# Patient Record
Sex: MALE | Race: WHITE | NOT HISPANIC OR LATINO
[De-identification: names, ages, dates, MRNs, and addresses within clinical notes are randomized per-mention and may not be internally consistent; named-entity substitution may affect disease eponyms.]

---

## 2017-01-06 ENCOUNTER — APPOINTMENT (OUTPATIENT)
Dept: OPHTHALMOLOGY | Facility: CLINIC | Age: 52
End: 2017-01-06

## 2017-02-23 ENCOUNTER — APPOINTMENT (OUTPATIENT)
Dept: OPHTHALMOLOGY | Facility: CLINIC | Age: 52
End: 2017-02-23

## 2017-02-23 ENCOUNTER — APPOINTMENT (OUTPATIENT)
Dept: INTERNAL MEDICINE | Facility: CLINIC | Age: 52
End: 2017-02-23

## 2017-02-23 VITALS — RESPIRATION RATE: 12 BRPM | SYSTOLIC BLOOD PRESSURE: 122 MMHG | HEART RATE: 75 BPM | DIASTOLIC BLOOD PRESSURE: 74 MMHG

## 2017-02-23 VITALS — WEIGHT: 202 LBS | HEIGHT: 68 IN | BODY MASS INDEX: 30.62 KG/M2

## 2017-02-23 VITALS — DIASTOLIC BLOOD PRESSURE: 80 MMHG | SYSTOLIC BLOOD PRESSURE: 116 MMHG

## 2017-02-23 DIAGNOSIS — I25.10 ATHEROSCLEROTIC HEART DISEASE OF NATIVE CORONARY ARTERY W/OUT ANGINA PECTORIS: ICD-10-CM

## 2017-02-23 DIAGNOSIS — G47.33 OBSTRUCTIVE SLEEP APNEA (ADULT) (PEDIATRIC): ICD-10-CM

## 2017-02-23 DIAGNOSIS — E11.9 TYPE 2 DIABETES MELLITUS W/OUT COMPLICATIONS: ICD-10-CM

## 2017-02-27 ENCOUNTER — APPOINTMENT (OUTPATIENT)
Dept: OPHTHALMOLOGY | Facility: AMBULATORY MEDICAL SERVICES | Age: 52
End: 2017-02-27

## 2017-02-28 ENCOUNTER — APPOINTMENT (OUTPATIENT)
Dept: OPHTHALMOLOGY | Facility: CLINIC | Age: 52
End: 2017-02-28

## 2017-03-07 ENCOUNTER — APPOINTMENT (OUTPATIENT)
Dept: OPHTHALMOLOGY | Facility: CLINIC | Age: 52
End: 2017-03-07

## 2017-03-28 ENCOUNTER — APPOINTMENT (OUTPATIENT)
Dept: OPHTHALMOLOGY | Facility: CLINIC | Age: 52
End: 2017-03-28

## 2017-04-04 ENCOUNTER — APPOINTMENT (OUTPATIENT)
Dept: OPHTHALMOLOGY | Facility: CLINIC | Age: 52
End: 2017-04-04

## 2017-04-24 ENCOUNTER — APPOINTMENT (OUTPATIENT)
Dept: OPHTHALMOLOGY | Facility: AMBULATORY MEDICAL SERVICES | Age: 52
End: 2017-04-24

## 2017-04-25 ENCOUNTER — APPOINTMENT (OUTPATIENT)
Dept: OPHTHALMOLOGY | Facility: CLINIC | Age: 52
End: 2017-04-25

## 2017-05-02 ENCOUNTER — APPOINTMENT (OUTPATIENT)
Dept: OPHTHALMOLOGY | Facility: CLINIC | Age: 52
End: 2017-05-02

## 2017-05-22 ENCOUNTER — APPOINTMENT (OUTPATIENT)
Dept: ORTHOPEDIC SURGERY | Facility: CLINIC | Age: 52
End: 2017-05-22

## 2017-05-22 VITALS
HEART RATE: 76 BPM | HEIGHT: 68 IN | SYSTOLIC BLOOD PRESSURE: 153 MMHG | BODY MASS INDEX: 30.62 KG/M2 | WEIGHT: 202 LBS | DIASTOLIC BLOOD PRESSURE: 83 MMHG

## 2017-06-08 ENCOUNTER — APPOINTMENT (OUTPATIENT)
Dept: OPHTHALMOLOGY | Facility: CLINIC | Age: 52
End: 2017-06-08

## 2017-09-05 ENCOUNTER — APPOINTMENT (OUTPATIENT)
Dept: OPHTHALMOLOGY | Facility: CLINIC | Age: 52
End: 2017-09-05
Payer: COMMERCIAL

## 2017-09-05 DIAGNOSIS — H25.13 AGE-RELATED NUCLEAR CATARACT, BILATERAL: ICD-10-CM

## 2017-09-05 PROCEDURE — 92012 INTRM OPH EXAM EST PATIENT: CPT

## 2017-10-09 ENCOUNTER — RX RENEWAL (OUTPATIENT)
Age: 52
End: 2017-10-09

## 2017-10-23 ENCOUNTER — MED ADMIN CHARGE (OUTPATIENT)
Age: 52
End: 2017-10-23

## 2017-10-23 ENCOUNTER — OTHER (OUTPATIENT)
Age: 52
End: 2017-10-23

## 2018-01-12 ENCOUNTER — APPOINTMENT (OUTPATIENT)
Dept: OPHTHALMOLOGY | Facility: CLINIC | Age: 53
End: 2018-01-12
Payer: COMMERCIAL

## 2018-01-12 DIAGNOSIS — H26.493 OTHER SECONDARY CATARACT, BILATERAL: ICD-10-CM

## 2018-01-12 DIAGNOSIS — H40.003 PREGLAUCOMA, UNSPECIFIED, BILATERAL: ICD-10-CM

## 2018-01-12 PROCEDURE — 92250 FUNDUS PHOTOGRAPHY W/I&R: CPT

## 2018-01-12 PROCEDURE — 92014 COMPRE OPH EXAM EST PT 1/>: CPT

## 2018-08-29 ENCOUNTER — APPOINTMENT (OUTPATIENT)
Dept: RHEUMATOLOGY | Facility: CLINIC | Age: 53
End: 2018-08-29
Payer: COMMERCIAL

## 2018-08-29 VITALS — HEIGHT: 68 IN | WEIGHT: 222 LBS | BODY MASS INDEX: 33.65 KG/M2

## 2018-08-29 DIAGNOSIS — M25.521 PAIN IN RIGHT ELBOW: ICD-10-CM

## 2018-08-29 DIAGNOSIS — M25.562 PAIN IN LEFT KNEE: ICD-10-CM

## 2018-08-29 DIAGNOSIS — G89.29 PAIN IN LEFT KNEE: ICD-10-CM

## 2018-08-29 PROCEDURE — 99244 OFF/OP CNSLTJ NEW/EST MOD 40: CPT | Mod: 25

## 2018-08-29 PROCEDURE — 20605 DRAIN/INJ JOINT/BURSA W/O US: CPT | Mod: RT

## 2018-08-29 RX ORDER — METHYLPREDNISOLONE 4 MG/1
4 TABLET ORAL
Qty: 1 | Refills: 1 | Status: COMPLETED | COMMUNITY
Start: 2017-05-22 | End: 2018-08-29

## 2018-08-29 RX ORDER — PREDNISOLONE ACETATE 10 MG/ML
1 SUSPENSION/ DROPS OPHTHALMIC
Qty: 1 | Refills: 5 | Status: COMPLETED | COMMUNITY
Start: 2017-02-14 | End: 2018-08-29

## 2018-08-29 RX ORDER — OFLOXACIN 3 MG/ML
0.3 SOLUTION/ DROPS OPHTHALMIC 4 TIMES DAILY
Qty: 1 | Refills: 5 | Status: COMPLETED | COMMUNITY
Start: 2017-02-14 | End: 2018-08-29

## 2018-08-29 RX ORDER — KETOROLAC TROMETHAMINE 4 MG/ML
0.4 SOLUTION/ DROPS OPHTHALMIC
Qty: 1 | Refills: 5 | Status: COMPLETED | COMMUNITY
Start: 2017-02-14 | End: 2018-08-29

## 2018-09-24 ENCOUNTER — APPOINTMENT (OUTPATIENT)
Dept: ORTHOPEDIC SURGERY | Facility: CLINIC | Age: 53
End: 2018-09-24
Payer: COMMERCIAL

## 2018-09-24 ENCOUNTER — OTHER (OUTPATIENT)
Age: 53
End: 2018-09-24

## 2018-09-24 VITALS
WEIGHT: 222 LBS | BODY MASS INDEX: 33.65 KG/M2 | SYSTOLIC BLOOD PRESSURE: 135 MMHG | DIASTOLIC BLOOD PRESSURE: 79 MMHG | HEART RATE: 63 BPM | HEIGHT: 68 IN

## 2018-09-24 DIAGNOSIS — M70.21 OLECRANON BURSITIS, RIGHT ELBOW: ICD-10-CM

## 2018-09-24 PROCEDURE — 73080 X-RAY EXAM OF ELBOW: CPT | Mod: RT

## 2018-09-24 PROCEDURE — 99214 OFFICE O/P EST MOD 30 MIN: CPT

## 2018-09-26 PROBLEM — M70.21 OLECRANON BURSITIS OF RIGHT ELBOW: Status: ACTIVE | Noted: 2018-08-29

## 2018-10-23 ENCOUNTER — RX RENEWAL (OUTPATIENT)
Age: 53
End: 2018-10-23

## 2019-04-30 ENCOUNTER — TRANSCRIPTION ENCOUNTER (OUTPATIENT)
Age: 54
End: 2019-04-30

## 2019-09-03 ENCOUNTER — RX RENEWAL (OUTPATIENT)
Age: 54
End: 2019-09-03

## 2020-01-17 ENCOUNTER — APPOINTMENT (OUTPATIENT)
Dept: OPHTHALMOLOGY | Facility: CLINIC | Age: 55
End: 2020-01-17
Payer: COMMERCIAL

## 2020-01-17 ENCOUNTER — NON-APPOINTMENT (OUTPATIENT)
Age: 55
End: 2020-01-17

## 2020-01-17 PROCEDURE — 92133 CPTRZD OPH DX IMG PST SGM ON: CPT

## 2020-01-17 PROCEDURE — 92083 EXTENDED VISUAL FIELD XM: CPT

## 2020-01-17 PROCEDURE — 92012 INTRM OPH EXAM EST PATIENT: CPT

## 2020-04-07 ENCOUNTER — APPOINTMENT (OUTPATIENT)
Dept: DISASTER EMERGENCY | Facility: CLINIC | Age: 55
End: 2020-04-07

## 2020-04-26 ENCOUNTER — MESSAGE (OUTPATIENT)
Age: 55
End: 2020-04-26

## 2020-05-04 ENCOUNTER — APPOINTMENT (OUTPATIENT)
Dept: DISASTER EMERGENCY | Facility: HOSPITAL | Age: 55
End: 2020-05-04

## 2020-05-05 LAB
SARS-COV-2 IGG SERPL IA-ACNC: <0.1 INDEX
SARS-COV-2 IGG SERPL QL IA: NEGATIVE

## 2020-06-11 LAB
25(OH)D3 SERPL-MCNC: 33.3 NG/ML
ALBUMIN SERPL ELPH-MCNC: 4.4 G/DL
ALP BLD-CCNC: 66 U/L
ALT SERPL-CCNC: 17 U/L
ANION GAP SERPL CALC-SCNC: 10 MMOL/L
AST SERPL-CCNC: 16 U/L
BASOPHILS # BLD AUTO: 0.03 K/UL
BASOPHILS NFR BLD AUTO: 0.5 %
BILIRUB SERPL-MCNC: 0.6 MG/DL
BUN SERPL-MCNC: 17 MG/DL
CALCIUM SERPL-MCNC: 9.9 MG/DL
CHLORIDE SERPL-SCNC: 102 MMOL/L
CHOLEST SERPL-MCNC: 148 MG/DL
CHOLEST/HDLC SERPL: 3.4 RATIO
CO2 SERPL-SCNC: 26 MMOL/L
CREAT SERPL-MCNC: 0.98 MG/DL
EOSINOPHIL # BLD AUTO: 0.09 K/UL
EOSINOPHIL NFR BLD AUTO: 1.5 %
ESTIMATED AVERAGE GLUCOSE: 157 MG/DL
GLUCOSE SERPL-MCNC: 148 MG/DL
HBA1C MFR BLD HPLC: 7.1 %
HCT VFR BLD CALC: 45.8 %
HDLC SERPL-MCNC: 44 MG/DL
HGB BLD-MCNC: 14.2 G/DL
IMM GRANULOCYTES NFR BLD AUTO: 0 %
LDLC SERPL CALC-MCNC: 73 MG/DL
LYMPHOCYTES # BLD AUTO: 1.59 K/UL
LYMPHOCYTES NFR BLD AUTO: 25.7 %
MAN DIFF?: NORMAL
MCHC RBC-ENTMCNC: 30.9 PG
MCHC RBC-ENTMCNC: 31 GM/DL
MCV RBC AUTO: 99.6 FL
MONOCYTES # BLD AUTO: 0.57 K/UL
MONOCYTES NFR BLD AUTO: 9.2 %
NEUTROPHILS # BLD AUTO: 3.91 K/UL
NEUTROPHILS NFR BLD AUTO: 63.1 %
PLATELET # BLD AUTO: 233 K/UL
POTASSIUM SERPL-SCNC: 5.4 MMOL/L
PROT SERPL-MCNC: 6.7 G/DL
PSA FREE FLD-MCNC: 58 %
PSA FREE SERPL-MCNC: 0.41 NG/ML
PSA SERPL-MCNC: 0.71 NG/ML
RBC # BLD: 4.6 M/UL
RBC # FLD: 13.5 %
SODIUM SERPL-SCNC: 138 MMOL/L
TRIGL SERPL-MCNC: 154 MG/DL
WBC # FLD AUTO: 6.19 K/UL

## 2020-06-15 LAB
HGB A MFR BLD: 97.5 %
HGB A2 MFR BLD: 2.5 %
HGB FRACT BLD-IMP: NORMAL

## 2020-07-28 ENCOUNTER — MED ADMIN CHARGE (OUTPATIENT)
Age: 55
End: 2020-07-28

## 2020-07-28 DIAGNOSIS — Z23 ENCOUNTER FOR IMMUNIZATION: ICD-10-CM

## 2021-04-01 ENCOUNTER — RX RENEWAL (OUTPATIENT)
Age: 56
End: 2021-04-01

## 2021-04-19 ENCOUNTER — APPOINTMENT (OUTPATIENT)
Dept: CARDIOLOGY | Facility: CLINIC | Age: 56
End: 2021-04-19

## 2021-04-21 LAB
ALBUMIN SERPL ELPH-MCNC: 4.6 G/DL
ALP BLD-CCNC: 74 U/L
ALT SERPL-CCNC: 29 U/L
ANION GAP SERPL CALC-SCNC: 10 MMOL/L
AST SERPL-CCNC: 22 U/L
BASOPHILS # BLD AUTO: 0.03 K/UL
BASOPHILS NFR BLD AUTO: 0.4 %
BILIRUB SERPL-MCNC: 0.7 MG/DL
BUN SERPL-MCNC: 13 MG/DL
CALCIUM SERPL-MCNC: 9.9 MG/DL
CHLORIDE SERPL-SCNC: 102 MMOL/L
CHOLEST SERPL-MCNC: 185 MG/DL
CO2 SERPL-SCNC: 27 MMOL/L
CREAT SERPL-MCNC: 0.97 MG/DL
EOSINOPHIL # BLD AUTO: 0.16 K/UL
EOSINOPHIL NFR BLD AUTO: 2 %
ESTIMATED AVERAGE GLUCOSE: 192 MG/DL
GLUCOSE SERPL-MCNC: 176 MG/DL
HBA1C MFR BLD HPLC: 8.3 %
HCT VFR BLD CALC: 45.6 %
HDLC SERPL-MCNC: 40 MG/DL
HGB BLD-MCNC: 14.9 G/DL
IMM GRANULOCYTES NFR BLD AUTO: 0.2 %
LDLC SERPL CALC-MCNC: 92 MG/DL
LYMPHOCYTES # BLD AUTO: 1.89 K/UL
LYMPHOCYTES NFR BLD AUTO: 23.1 %
MAN DIFF?: NORMAL
MCHC RBC-ENTMCNC: 30.7 PG
MCHC RBC-ENTMCNC: 32.7 GM/DL
MCV RBC AUTO: 93.8 FL
MONOCYTES # BLD AUTO: 0.71 K/UL
MONOCYTES NFR BLD AUTO: 8.7 %
NEUTROPHILS # BLD AUTO: 5.36 K/UL
NEUTROPHILS NFR BLD AUTO: 65.6 %
NONHDLC SERPL-MCNC: 145 MG/DL
PLATELET # BLD AUTO: 245 K/UL
POTASSIUM SERPL-SCNC: 4.7 MMOL/L
PROT SERPL-MCNC: 7 G/DL
RBC # BLD: 4.86 M/UL
RBC # FLD: 13.3 %
SODIUM SERPL-SCNC: 139 MMOL/L
TRIGL SERPL-MCNC: 266 MG/DL
WBC # FLD AUTO: 8.17 K/UL

## 2021-05-11 ENCOUNTER — TRANSCRIPTION ENCOUNTER (OUTPATIENT)
Age: 56
End: 2021-05-11

## 2021-05-13 ENCOUNTER — APPOINTMENT (OUTPATIENT)
Dept: ENDOCRINOLOGY | Facility: CLINIC | Age: 56
End: 2021-05-13
Payer: COMMERCIAL

## 2021-05-13 VITALS
BODY MASS INDEX: 31.93 KG/M2 | OXYGEN SATURATION: 96 % | TEMPERATURE: 97.2 F | DIASTOLIC BLOOD PRESSURE: 74 MMHG | WEIGHT: 210 LBS | HEART RATE: 78 BPM | SYSTOLIC BLOOD PRESSURE: 126 MMHG

## 2021-05-13 DIAGNOSIS — Z83.3 FAMILY HISTORY OF DIABETES MELLITUS: ICD-10-CM

## 2021-05-13 PROCEDURE — 99072 ADDL SUPL MATRL&STAF TM PHE: CPT

## 2021-05-13 PROCEDURE — 99204 OFFICE O/P NEW MOD 45 MIN: CPT

## 2021-05-13 RX ORDER — PEN NEEDLE, DIABETIC 29 G X1/2"
32G X 4 MM NEEDLE, DISPOSABLE MISCELLANEOUS
Qty: 1 | Refills: 1 | Status: ACTIVE | COMMUNITY
Start: 2021-05-13 | End: 1900-01-01

## 2021-05-13 NOTE — HISTORY OF PRESENT ILLNESS
[FreeTextEntry1] : cc: diabetes\par \par 55 year old man with CAD post PTCA in 2004, recently diagnosed with T2DM.  He has had prediabetes for the past 5 years or so, then last hba1c 8.1.   Not currently on medication.  He limts carbs ` 90% of the time, red meat once per week, fish 3-4 times per week, eats a lot of salad/vegetables.  [place] diet.   Walks 30 minutes every other morning (=goal).  Has seen opthalmologist withint past year, no retinopathy.  No nephropathy or neuropathy.  No numbness/tingling in extremities.  No blurry vision (had b/l cataract surgery\par \par Has not had hypertension\par \par Hyperlipidemia on crestor 20 mg

## 2021-05-13 NOTE — ADDENDUM
[FreeTextEntry1] : Patient instructed in correct injection technique for  Ozempic . Educated on  dose, frequency for medication. Educated on storage and sharps disposal. Educated on common side effects. Patient demonstrates understanding by teach back and return demonstration. \otis Garsia RN

## 2021-05-13 NOTE — PHYSICAL EXAM
[Alert] : alert [No Acute Distress] : no acute distress [Normal Sclera/Conjunctiva] : normal sclera/conjunctiva [No Proptosis] : no proptosis [No LAD] : no lymphadenopathy [Thyroid Not Enlarged] : the thyroid was not enlarged [No Respiratory Distress] : no respiratory distress [Clear to Auscultation] : lungs were clear to auscultation bilaterally [Normal S1, S2] : normal S1 and S2 [Regular Rhythm] : with a regular rhythm [No Edema] : no peripheral edema [Pedal Pulses Normal] : the pedal pulses are present [Normal Bowel Sounds] : normal bowel sounds [Not Tender] : non-tender [Soft] : abdomen soft [No Spinal Tenderness] : no spinal tenderness [No Involuntary Movements] : no involuntary movements were seen [Normal Strength/Tone] : muscle strength and tone were normal [Right Foot Was Examined] : right foot ~C was examined [Left Foot Was Examined] : left foot ~C was examined [Normal Reflexes] : deep tendon reflexes were 2+ and symmetric [No Tremors] : no tremors [Normal Affect] : the affect was normal [Normal Mood] : the mood was normal [Foot Ulcers] : no foot ulcers

## 2021-05-13 NOTE — ASSESSMENT
[FreeTextEntry1] : 55 year old man with T2DM, uncontrolled, recently diagnosed and obesity\par  - Discussed with pt carbohydrate consistent diet, he does not feel that he would benefit from meeting with and RD, as already limiting carbohydrate intake.  Counseled on increasing exercise\par  - Given history of atherosclerotic heart disease, Recommend GLP-1.  Will start with ozempic 0.25 mg daily, plan to increase to 0.5 mg after 4 weeks.  Counseled pt on risks/potential side effects\par  - Recommend checking glucose at different times of day and send glucose log in 2 weeks\par  - Retinopathy screening up to date\par  - Blood pressure at goal\par  - Check urine microalbumin\par - Check TSH\par  - Had covid vaccine\par \par Hyperlipidemia - increase rosuvastatin to 40 mg, repeat fasting lipids next visit, consider adding ezetimibe\par \par f/u 3 months, send glucose log in 3 months

## 2021-05-13 NOTE — REVIEW OF SYSTEMS
[As Noted in HPI] : as noted in HPI [Fatigue] : no fatigue [Visual Field Defect] : no visual field defect [Dysphagia] : no dysphagia [Chest Pain] : no chest pain [Shortness Of Breath] : no shortness of breath [Nausea] : no nausea [Dysuria] : no dysuria [Joint Pain] : no joint pain [Acanthosis] : no acanthosis  [Headaches] : no headaches [Depression] : no depression [Easy Bleeding] : no ~M tendency for easy bleeding

## 2021-05-14 ENCOUNTER — TRANSCRIPTION ENCOUNTER (OUTPATIENT)
Age: 56
End: 2021-05-14

## 2021-05-14 LAB
CREAT SPEC-SCNC: 183 MG/DL
MICROALBUMIN 24H UR DL<=1MG/L-MCNC: <1.2 MG/DL
MICROALBUMIN/CREAT 24H UR-RTO: NORMAL MG/G
TSH SERPL-ACNC: 2.04 UIU/ML

## 2021-05-15 ENCOUNTER — TRANSCRIPTION ENCOUNTER (OUTPATIENT)
Age: 56
End: 2021-05-15

## 2021-06-08 ENCOUNTER — TRANSCRIPTION ENCOUNTER (OUTPATIENT)
Age: 56
End: 2021-06-08

## 2021-08-09 ENCOUNTER — APPOINTMENT (OUTPATIENT)
Dept: ENDOCRINOLOGY | Facility: CLINIC | Age: 56
End: 2021-08-09
Payer: COMMERCIAL

## 2021-08-09 VITALS
HEART RATE: 76 BPM | TEMPERATURE: 97.7 F | WEIGHT: 207.67 LBS | OXYGEN SATURATION: 97 % | SYSTOLIC BLOOD PRESSURE: 122 MMHG | BODY MASS INDEX: 31.58 KG/M2 | DIASTOLIC BLOOD PRESSURE: 80 MMHG

## 2021-08-09 LAB — HBA1C MFR BLD HPLC: 7.3

## 2021-08-09 PROCEDURE — 83036 HEMOGLOBIN GLYCOSYLATED A1C: CPT | Mod: QW

## 2021-08-09 PROCEDURE — 99214 OFFICE O/P EST MOD 30 MIN: CPT | Mod: 25

## 2021-08-09 NOTE — ASSESSMENT
[FreeTextEntry1] : 56 year old man with T2DM, Hba1c improved, slightly above goal\par  - Recommend increasing ozempic to 1.0 mg\par  - Check  glucose at different times of day and send glucose log \par  - Retinopathy screening up to date\par  - Blood pressure at goal\par  - urine microalbumin neg\par  - Had covid vaccine\par \par Hyperlipidemia - check fasting lipids on increase dose of crestor\par \par f/u 3 months, send glucose  log ~ 2 weeks after increasing ozempic dose

## 2021-08-09 NOTE — PHYSICAL EXAM
[Alert] : alert [Healthy Appearance] : healthy appearance [No Acute Distress] : no acute distress [Normal Sclera/Conjunctiva] : normal sclera/conjunctiva [No Proptosis] : no proptosis [No Respiratory Distress] : no respiratory distress [Clear to Auscultation] : lungs were clear to auscultation bilaterally [Normal S1, S2] : normal S1 and S2 [Regular Rhythm] : with a regular rhythm [No Edema] : no peripheral edema [Normal Bowel Sounds] : normal bowel sounds [Not Tender] : non-tender [Soft] : abdomen soft [No Spinal Tenderness] : no spinal tenderness [No Involuntary Movements] : no involuntary movements were seen [Normal Reflexes] : deep tendon reflexes were 2+ and symmetric [No Tremors] : no tremors [Normal Affect] : the affect was normal [Normal Mood] : the mood was normal

## 2021-08-09 NOTE — HISTORY OF PRESENT ILLNESS
[FreeTextEntry1] : cc: diabetes\par \par 56 year old man with CAD post PTCA in 2004, recently diagnosed with T2DM.  Currently taking ozempic 0.5 mg weekly.  Feeling ok.  Has had decreased appetite.  Some aches and pains.  Fasting glucose 130s mg/dl.  Sometimes checks later in the day, values up to 167 mg/dl.\par He has been limiting carbohydrate intake to some extent.   Walks 30 minutes twice weekly  Last opthalmologist  visit was about 6 months ago  , no retinopathy.  No nephropathy or neuropathy.  \par \par Blood pressure has been controlled\par \par Hyperlipidemia: taking  crestor 40 mg\par \par Had covid vaccine

## 2021-12-13 ENCOUNTER — APPOINTMENT (OUTPATIENT)
Dept: ENDOCRINOLOGY | Facility: CLINIC | Age: 56
End: 2021-12-13
Payer: COMMERCIAL

## 2021-12-13 VITALS
SYSTOLIC BLOOD PRESSURE: 118 MMHG | TEMPERATURE: 97.4 F | DIASTOLIC BLOOD PRESSURE: 62 MMHG | BODY MASS INDEX: 30.17 KG/M2 | WEIGHT: 198.42 LBS | HEART RATE: 79 BPM | OXYGEN SATURATION: 98 %

## 2021-12-13 DIAGNOSIS — E11.65 TYPE 2 DIABETES MELLITUS WITH HYPERGLYCEMIA: ICD-10-CM

## 2021-12-13 LAB — HBA1C MFR BLD HPLC: 6.2

## 2021-12-13 PROCEDURE — 83036 HEMOGLOBIN GLYCOSYLATED A1C: CPT | Mod: QW

## 2021-12-13 PROCEDURE — 99214 OFFICE O/P EST MOD 30 MIN: CPT | Mod: 25

## 2021-12-13 NOTE — ASSESSMENT
[FreeTextEntry1] : 56 year old man with T2DM, well controlled and obesity\par  - Continue  ozempic  1.0 mg weekly\par  - Recommend checking occasional postprandial  glucose values.\par  - Retinopathy screening up to date\par  - Blood pressure at goal\par  - urine microalbumin neg\par  - Had covid vaccine\par  - had flu shot\par  - check cmp\par \par Hyperlipidemia - check fasting lipids on increased dose of crestor\par \par Will be having repeat blood tests with pmd soon and will send results\par \par f/u 6 months, send lipid, bmp results

## 2021-12-13 NOTE — HISTORY OF PRESENT ILLNESS
[FreeTextEntry1] : cc: diabetes\par \par 56 year old man with CAD post PTCA in 2004,  diagnosed with T2DM <1 year ago.  Currently taking ozempic 1.0 mg weekly.  Checking glucose fasting, values have been 120 - 130 mg/dl\par No hypoglycemia\par Limiting carbohydrate intake somewhat.  Walks 30 minutes twice weekly  Has been losing weight.  Last opthalmologist  visit was about 9 months ago  , no retinopathy.  No nephropathy or neuropathy.  \par \par Blood pressure has been controlled\par \par Hyperlipidemia: taking  crestor 40 mg\par \par Had covid vaccine\par Had flu shot

## 2022-03-22 ENCOUNTER — TRANSCRIPTION ENCOUNTER (OUTPATIENT)
Age: 57
End: 2022-03-22

## 2022-03-28 ENCOUNTER — RX RENEWAL (OUTPATIENT)
Age: 57
End: 2022-03-28

## 2022-03-31 ENCOUNTER — RX RENEWAL (OUTPATIENT)
Age: 57
End: 2022-03-31

## 2022-06-20 ENCOUNTER — APPOINTMENT (OUTPATIENT)
Dept: ENDOCRINOLOGY | Facility: CLINIC | Age: 57
End: 2022-06-20

## 2022-07-14 ENCOUNTER — APPOINTMENT (OUTPATIENT)
Dept: ENDOCRINOLOGY | Facility: CLINIC | Age: 57
End: 2022-07-14

## 2022-07-14 VITALS
SYSTOLIC BLOOD PRESSURE: 128 MMHG | HEIGHT: 68 IN | BODY MASS INDEX: 30.62 KG/M2 | HEART RATE: 76 BPM | DIASTOLIC BLOOD PRESSURE: 78 MMHG | WEIGHT: 202 LBS | OXYGEN SATURATION: 98 % | TEMPERATURE: 97.3 F

## 2022-07-14 LAB
GLUCOSE BLDC GLUCOMTR-MCNC: 133
HBA1C MFR BLD HPLC: 5.9

## 2022-07-14 PROCEDURE — 83036 HEMOGLOBIN GLYCOSYLATED A1C: CPT | Mod: QW

## 2022-07-14 PROCEDURE — 82962 GLUCOSE BLOOD TEST: CPT

## 2022-07-14 PROCEDURE — 99214 OFFICE O/P EST MOD 30 MIN: CPT | Mod: 25

## 2022-07-14 NOTE — PHYSICAL EXAM
[Alert] : alert [Healthy Appearance] : healthy appearance [No Acute Distress] : no acute distress [Normal Sclera/Conjunctiva] : normal sclera/conjunctiva [No Proptosis] : no proptosis [No LAD] : no lymphadenopathy [Thyroid Not Enlarged] : the thyroid was not enlarged [No Respiratory Distress] : no respiratory distress [Clear to Auscultation] : lungs were clear to auscultation bilaterally [Normal S1, S2] : normal S1 and S2 [Regular Rhythm] : with a regular rhythm [No Edema] : no peripheral edema [Pedal Pulses Normal] : the pedal pulses are present [Normal Bowel Sounds] : normal bowel sounds [Soft] : abdomen soft [No Spinal Tenderness] : no spinal tenderness [No Involuntary Movements] : no involuntary movements were seen [Normal Strength/Tone] : muscle strength and tone were normal [Normal Reflexes] : deep tendon reflexes were 2+ and symmetric [No Tremors] : no tremors [Normal Sensation on Monofilament Testing] : normal sensation on monofilament testing of lower extremities [Normal Affect] : the affect was normal [Normal Mood] : the mood was normal [Foot Ulcers] : no foot ulcers

## 2022-07-14 NOTE — HISTORY OF PRESENT ILLNESS
[FreeTextEntry1] : cc: diabetes\par \par 57 year old man with CAD post PTCA in 2004,  diagnosed with T2DM ~1 year ago.  Currently taking ozempic 1.0 mg weekly.  He checks glucose fasting, values have been 120 - 130 mg/dl, reports no hypoglycemia\par DIet-Limiting carbohydrate intake somewhat.  \par Exercise -Walks 30 minutes 4 x weekly.\par  Last opthalmologist  visit was within the year  , no retinopathy.  No nephropathy or neuropathy.  \par \par Blood pressure has been controlled\par \par Hyperlipidemia: taking  crestor 40 mg\par \par Had covid vaccine\par

## 2022-07-14 NOTE — ASSESSMENT
[FreeTextEntry1] : 56 year old man with T2DM, well controlled and obesity\par  - Continue  ozempic  1.0 mg weekly\par  - Retinopathy screening up to date\par  - Blood pressure at goal\par  - check urine microalbumin \par  - Had covid vaccine\par  - check cmp\par \par Hyperlipidemia - check lipids\par \par \par \par f/u 6 months,

## 2022-07-19 PROBLEM — H40.003 GLAUCOMA SUSPECT OF BOTH EYES: Status: ACTIVE | Noted: 2017-01-06

## 2022-10-03 ENCOUNTER — LABORATORY RESULT (OUTPATIENT)
Age: 57
End: 2022-10-03

## 2022-10-07 LAB
ALBUMIN SERPL ELPH-MCNC: 4.8 G/DL
ALP BLD-CCNC: 59 U/L
ALT SERPL-CCNC: 27 U/L
ANION GAP SERPL CALC-SCNC: 11 MMOL/L
AST SERPL-CCNC: 24 U/L
BILIRUB SERPL-MCNC: 0.5 MG/DL
BUN SERPL-MCNC: 16 MG/DL
CALCIUM SERPL-MCNC: 10.3 MG/DL
CHLORIDE SERPL-SCNC: 102 MMOL/L
CHOLEST SERPL-MCNC: 191 MG/DL
CO2 SERPL-SCNC: 27 MMOL/L
CREAT SERPL-MCNC: 0.98 MG/DL
EGFR: 90 ML/MIN/1.73M2
GLUCOSE SERPL-MCNC: 106 MG/DL
HDLC SERPL-MCNC: 53 MG/DL
LDLC SERPL CALC-MCNC: 105 MG/DL
NONHDLC SERPL-MCNC: 137 MG/DL
POTASSIUM SERPL-SCNC: 5.1 MMOL/L
PROT SERPL-MCNC: 7.2 G/DL
SODIUM SERPL-SCNC: 140 MMOL/L
TRIGL SERPL-MCNC: 163 MG/DL

## 2022-11-05 ENCOUNTER — NON-APPOINTMENT (OUTPATIENT)
Age: 57
End: 2022-11-05

## 2022-11-16 ENCOUNTER — RX RENEWAL (OUTPATIENT)
Age: 57
End: 2022-11-16

## 2023-01-09 ENCOUNTER — APPOINTMENT (OUTPATIENT)
Dept: ENDOCRINOLOGY | Facility: CLINIC | Age: 58
End: 2023-01-09
Payer: COMMERCIAL

## 2023-01-09 VITALS
DIASTOLIC BLOOD PRESSURE: 76 MMHG | BODY MASS INDEX: 30.92 KG/M2 | HEIGHT: 68 IN | HEART RATE: 85 BPM | WEIGHT: 204 LBS | OXYGEN SATURATION: 98 % | SYSTOLIC BLOOD PRESSURE: 118 MMHG

## 2023-01-09 LAB — HBA1C MFR BLD HPLC: 6.3

## 2023-01-09 PROCEDURE — 83036 HEMOGLOBIN GLYCOSYLATED A1C: CPT | Mod: QW

## 2023-01-09 PROCEDURE — 99214 OFFICE O/P EST MOD 30 MIN: CPT | Mod: 25

## 2023-01-09 NOTE — ASSESSMENT
[FreeTextEntry1] : 57 year old man with T2DM, well controlled and obesity\par  - Continue  ozempic  1.0 mg weekly\par  - Retinopathy screening up to date\par  - Blood pressure at goal\par  - urine microalbumin neg\par  - had flu shot\par \par Hyperlipidemia - continue rosuvastatin. Will repeat next visit, and if still elevated add ezetimibe\par \par \par \par f/u 6 months,

## 2023-01-09 NOTE — HISTORY OF PRESENT ILLNESS
[FreeTextEntry1] : cc: diabetes\par \par 57 year old man with CAD post PTCA in 2004,  diagnosed with T2DM ~1.5 years ago.  Currently taking ozempic 1.0 mg weekly. Sometimes forgets ozempic because weekly. Not checking glucose, no symptoms of hypoglycemia\par DIet-Limiting carbohydrate intake to a certain extent (less careful recently)\par Exercise -Walks 30 minutes 3 x weekly.\par  Last opthalmologist  visit was within the year  , no retinopathy.  No nephropathy or neuropathy.  \par Had flu shot\par \par He has not had hypertension\par \par Hyperlipidemia: taking  crestor 40 mg\par \par

## 2023-05-24 ENCOUNTER — APPOINTMENT (OUTPATIENT)
Dept: GASTROENTEROLOGY | Facility: CLINIC | Age: 58
End: 2023-05-24
Payer: COMMERCIAL

## 2023-05-24 VITALS
WEIGHT: 209.44 LBS | HEIGHT: 68 IN | DIASTOLIC BLOOD PRESSURE: 80 MMHG | HEART RATE: 76 BPM | SYSTOLIC BLOOD PRESSURE: 127 MMHG | BODY MASS INDEX: 31.74 KG/M2

## 2023-05-24 DIAGNOSIS — R05.9 COUGH, UNSPECIFIED: ICD-10-CM

## 2023-05-24 DIAGNOSIS — K62.5 HEMORRHAGE OF ANUS AND RECTUM: ICD-10-CM

## 2023-05-24 DIAGNOSIS — K58.2 MIXED IRRITABLE BOWEL SYNDROME: ICD-10-CM

## 2023-05-24 DIAGNOSIS — Z12.11 ENCOUNTER FOR SCREENING FOR MALIGNANT NEOPLASM OF COLON: ICD-10-CM

## 2023-05-24 DIAGNOSIS — Z95.5 PRESENCE OF CORONARY ANGIOPLASTY IMPLANT AND GRAFT: ICD-10-CM

## 2023-05-24 DIAGNOSIS — Z86.69 PERSONAL HISTORY OF OTHER DISEASES OF THE NERVOUS SYSTEM AND SENSE ORGANS: ICD-10-CM

## 2023-05-24 DIAGNOSIS — Z12.12 ENCOUNTER FOR SCREENING FOR MALIGNANT NEOPLASM OF COLON: ICD-10-CM

## 2023-05-24 PROCEDURE — 99203 OFFICE O/P NEW LOW 30 MIN: CPT

## 2023-05-24 RX ORDER — HYDROXYCHLOROQUINE SULFATE 200 MG/1
200 TABLET, FILM COATED ORAL
Qty: 16 | Refills: 0 | Status: DISCONTINUED | COMMUNITY
Start: 2020-03-24 | End: 2023-05-24

## 2023-05-24 RX ORDER — SODIUM SULFATE, POTASSIUM SULFATE AND MAGNESIUM SULFATE 1.6; 3.13; 17.5 G/177ML; G/177ML; G/177ML
17.5-3.13-1.6 SOLUTION ORAL
Qty: 1 | Refills: 0 | Status: ACTIVE | COMMUNITY
Start: 2023-05-24 | End: 1900-01-01

## 2023-05-24 NOTE — PHYSICAL EXAM
[No Acute Distress] : no acute distress [Well Developed] : well developed [Well Nourished] : well nourished [Obese (BMI >= 30)] : obese (BMI >= 30) [None] : no edema [Bowel Sounds] : normal bowel sounds [Abdomen Tenderness] : non-tender [No Masses] : no abdominal mass palpated [Abdomen Soft] : soft [] : no hepatosplenomegaly [No Hernia] : no hernia [Patient Refused] : rectal exam was refused by the patient [Inguinal Lymph Nodes Enlarged Bilaterally] : no inguinal lymphadenopathy [Normal Color / Pigmentation] : normal skin color and pigmentation [Normal] : oriented to person, place, and time [FreeTextEntry1] : wears glasses

## 2023-05-24 NOTE — HISTORY OF PRESENT ILLNESS
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  He has had long-standing intermittent abdominal discomfort, bloating, and erratic bowels, attributed to IBS.  The symptoms are more likely to occur when under stress, with travel, or dietary indiscretion. He has also noted some bright red blood per rectum from time to time, attributed to hemorrhoids.  Family history is negative for colorectal neoplasia.

## 2023-05-24 NOTE — ASSESSMENT
[FreeTextEntry1] : 1.  Rule out colorectal neoplasm.  Intermittent abdominal pain and erratic bowels suggest the diagnosis of IBS, and occasional rectal bleeding likely hemorrhoidal in origin.\par 2.  Status post 2 CAD stents 2004, maintained on baby aspirin.\par 3.  Obesity.\par 4.  Type 2 diabetes mellitus.\par 5.  Obstructive sleep apnea, on CPAP.\par 6.  Hyperlipidemia.\par 7.  Status post bilateral iridectomies.\par \par Plan:\par 1.  Medical records have been reviewed.\par 2.  Agree with need for baseline colonoscopy--Procedure, rationale, anesthesia plan, and split dose prep (such as Suprep) instructions were reviewed and brochure given.\par 3.  Get an internist!\par 4.  Other recommendations to follow.\par

## 2023-05-24 NOTE — REVIEW OF SYSTEMS
[Negative] : Heme/Lymph [As Noted in HPI] : as noted in HPI [Abdominal Pain] : abdominal pain [Constipation] : constipation [Diarrhea] : diarrhea [Bleeding] : bleeding

## 2023-07-10 ENCOUNTER — APPOINTMENT (OUTPATIENT)
Dept: ENDOCRINOLOGY | Facility: CLINIC | Age: 58
End: 2023-07-10
Payer: COMMERCIAL

## 2023-07-10 VITALS
DIASTOLIC BLOOD PRESSURE: 72 MMHG | BODY MASS INDEX: 33.04 KG/M2 | HEIGHT: 68 IN | WEIGHT: 218 LBS | SYSTOLIC BLOOD PRESSURE: 112 MMHG | HEART RATE: 77 BPM | OXYGEN SATURATION: 97 %

## 2023-07-10 LAB — HBA1C MFR BLD HPLC: 6.3

## 2023-07-10 PROCEDURE — 99214 OFFICE O/P EST MOD 30 MIN: CPT | Mod: 25

## 2023-07-10 PROCEDURE — 83036 HEMOGLOBIN GLYCOSYLATED A1C: CPT | Mod: QW

## 2023-07-10 NOTE — PHYSICAL EXAM
[Alert] : alert [Healthy Appearance] : healthy appearance [No Acute Distress] : no acute distress [Normal Sclera/Conjunctiva] : normal sclera/conjunctiva [No Proptosis] : no proptosis [Thyroid Not Enlarged] : the thyroid was not enlarged [No LAD] : no lymphadenopathy [No Respiratory Distress] : no respiratory distress [Clear to Auscultation] : lungs were clear to auscultation bilaterally [Normal S1, S2] : normal S1 and S2 [Regular Rhythm] : with a regular rhythm [No Edema] : no peripheral edema [Pedal Pulses Normal] : the pedal pulses are present [Soft] : abdomen soft [Normal Bowel Sounds] : normal bowel sounds [No Spinal Tenderness] : no spinal tenderness [No Involuntary Movements] : no involuntary movements were seen [Normal Strength/Tone] : muscle strength and tone were normal [Right foot was examined, including] : right foot ~C was examined, including visual inspection with sensory and pulse exams [Left foot was examined, including] : left foot ~C was examined, including visual inspection with sensory and pulse exams [Normal Reflexes] : deep tendon reflexes were 2+ and symmetric [No Tremors] : no tremors [Normal Sensation on Monofilament Testing] : normal sensation on monofilament testing of lower extremities [Normal Affect] : the affect was normal [Normal Mood] : the mood was normal [Foot Ulcers] : no foot ulcers [de-identified] : + callus

## 2023-07-10 NOTE — ASSESSMENT
[FreeTextEntry1] : 58 year old man with T2DM, well controlled and obesity\par  - Continue  ozempic, increase to  2.0 mg weekly for weight loss \par  - Due for Retinopathy screening\par  - Blood pressure at goal\par  - urine microalbumin neg\par  - check bmp\par \par Hyperlipidemia - continue rosuvastatin. Check fasting lipids and if LDL still elevated add ezetimibe\par \par \par \par f/u 6 months

## 2023-07-10 NOTE — HISTORY OF PRESENT ILLNESS
[FreeTextEntry1] : cc: diabetes\par \par 58 year old man with CAD post PTCA in 2004,  diagnosed with T2DM ~2 years ago.  Father in law recently passed away.  Past few months have been difficult.  Has been exercising less and has gained weight.   Currently taking ozempic 1.0 mg weekly.Has been adherent, leaving it in a place where he sees it. Not checking glucose, no symptoms of hypoglycemia\par DIet-not as careful with diet recently.\par Last opthalmologist  visit was within the year  , no retinopathy.  No nephropathy or neuropathy.  Has appt next month\par No polyuria or polydipsia\par \par He has not had hypertension\par \par Hyperlipidemia: takes  rosuvastatin 40 mg\par \par

## 2023-08-04 ENCOUNTER — APPOINTMENT (OUTPATIENT)
Dept: GASTROENTEROLOGY | Facility: CLINIC | Age: 58
End: 2023-08-04
Payer: COMMERCIAL

## 2023-08-04 ENCOUNTER — LABORATORY RESULT (OUTPATIENT)
Age: 58
End: 2023-08-04

## 2023-08-04 PROCEDURE — 45380 COLONOSCOPY AND BIOPSY: CPT

## 2023-08-08 ENCOUNTER — INPATIENT (INPATIENT)
Facility: HOSPITAL | Age: 58
LOS: 1 days | Discharge: HOME | DRG: 552 | End: 2023-08-10
Attending: INTERNAL MEDICINE | Admitting: INTERNAL MEDICINE
Payer: COMMERCIAL

## 2023-08-08 VITALS
SYSTOLIC BLOOD PRESSURE: 142 MMHG | HEART RATE: 68 BPM | DIASTOLIC BLOOD PRESSURE: 86 MMHG | WEIGHT: 210.1 LBS | HEIGHT: 68 IN | OXYGEN SATURATION: 98 % | TEMPERATURE: 98 F | RESPIRATION RATE: 18 BRPM

## 2023-08-08 DIAGNOSIS — M54.17 RADICULOPATHY, LUMBOSACRAL REGION: ICD-10-CM

## 2023-08-08 LAB — GLUCOSE BLDC GLUCOMTR-MCNC: 162 MG/DL — HIGH (ref 70–99)

## 2023-08-08 PROCEDURE — 72100 X-RAY EXAM L-S SPINE 2/3 VWS: CPT | Mod: 26

## 2023-08-08 PROCEDURE — 99285 EMERGENCY DEPT VISIT HI MDM: CPT

## 2023-08-08 RX ORDER — DEXTROSE 50 % IN WATER 50 %
25 SYRINGE (ML) INTRAVENOUS ONCE
Refills: 0 | Status: DISCONTINUED | OUTPATIENT
Start: 2023-08-08 | End: 2023-08-10

## 2023-08-08 RX ORDER — SENNA PLUS 8.6 MG/1
2 TABLET ORAL AT BEDTIME
Refills: 0 | Status: DISCONTINUED | OUTPATIENT
Start: 2023-08-08 | End: 2023-08-10

## 2023-08-08 RX ORDER — SODIUM CHLORIDE 9 MG/ML
1000 INJECTION, SOLUTION INTRAVENOUS
Refills: 0 | Status: DISCONTINUED | OUTPATIENT
Start: 2023-08-08 | End: 2023-08-10

## 2023-08-08 RX ORDER — INSULIN LISPRO 100/ML
VIAL (ML) SUBCUTANEOUS
Refills: 0 | Status: DISCONTINUED | OUTPATIENT
Start: 2023-08-08 | End: 2023-08-09

## 2023-08-08 RX ORDER — CYCLOBENZAPRINE HYDROCHLORIDE 10 MG/1
5 TABLET, FILM COATED ORAL THREE TIMES A DAY
Refills: 0 | Status: DISCONTINUED | OUTPATIENT
Start: 2023-08-08 | End: 2023-08-10

## 2023-08-08 RX ORDER — MORPHINE SULFATE 50 MG/1
4 CAPSULE, EXTENDED RELEASE ORAL ONCE
Refills: 0 | Status: DISCONTINUED | OUTPATIENT
Start: 2023-08-08 | End: 2023-08-08

## 2023-08-08 RX ORDER — MORPHINE SULFATE 50 MG/1
2 CAPSULE, EXTENDED RELEASE ORAL ONCE
Refills: 0 | Status: DISCONTINUED | OUTPATIENT
Start: 2023-08-08 | End: 2023-08-08

## 2023-08-08 RX ORDER — INSULIN LISPRO 100/ML
VIAL (ML) SUBCUTANEOUS AT BEDTIME
Refills: 0 | Status: DISCONTINUED | OUTPATIENT
Start: 2023-08-08 | End: 2023-08-10

## 2023-08-08 RX ORDER — ZOLPIDEM TARTRATE 10 MG/1
5 TABLET ORAL AT BEDTIME
Refills: 0 | Status: DISCONTINUED | OUTPATIENT
Start: 2023-08-08 | End: 2023-08-10

## 2023-08-08 RX ORDER — DIAZEPAM 5 MG
5 TABLET ORAL ONCE
Refills: 0 | Status: DISCONTINUED | OUTPATIENT
Start: 2023-08-08 | End: 2023-08-08

## 2023-08-08 RX ORDER — ASPIRIN/CALCIUM CARB/MAGNESIUM 324 MG
81 TABLET ORAL DAILY
Refills: 0 | Status: DISCONTINUED | OUTPATIENT
Start: 2023-08-08 | End: 2023-08-10

## 2023-08-08 RX ORDER — ACETAMINOPHEN 500 MG
1000 TABLET ORAL ONCE
Refills: 0 | Status: COMPLETED | OUTPATIENT
Start: 2023-08-08 | End: 2023-08-08

## 2023-08-08 RX ORDER — KETOROLAC TROMETHAMINE 30 MG/ML
15 SYRINGE (ML) INJECTION ONCE
Refills: 0 | Status: DISCONTINUED | OUTPATIENT
Start: 2023-08-08 | End: 2023-08-08

## 2023-08-08 RX ORDER — PANTOPRAZOLE SODIUM 20 MG/1
40 TABLET, DELAYED RELEASE ORAL
Refills: 0 | Status: DISCONTINUED | OUTPATIENT
Start: 2023-08-08 | End: 2023-08-10

## 2023-08-08 RX ORDER — ONDANSETRON 8 MG/1
4 TABLET, FILM COATED ORAL EVERY 6 HOURS
Refills: 0 | Status: DISCONTINUED | OUTPATIENT
Start: 2023-08-08 | End: 2023-08-10

## 2023-08-08 RX ORDER — ATORVASTATIN CALCIUM 80 MG/1
20 TABLET, FILM COATED ORAL AT BEDTIME
Refills: 0 | Status: DISCONTINUED | OUTPATIENT
Start: 2023-08-08 | End: 2023-08-10

## 2023-08-08 RX ORDER — DEXTROSE 50 % IN WATER 50 %
12.5 SYRINGE (ML) INTRAVENOUS ONCE
Refills: 0 | Status: DISCONTINUED | OUTPATIENT
Start: 2023-08-08 | End: 2023-08-10

## 2023-08-08 RX ORDER — GLUCAGON INJECTION, SOLUTION 0.5 MG/.1ML
1 INJECTION, SOLUTION SUBCUTANEOUS ONCE
Refills: 0 | Status: DISCONTINUED | OUTPATIENT
Start: 2023-08-08 | End: 2023-08-10

## 2023-08-08 RX ORDER — SODIUM CHLORIDE 9 MG/ML
1000 INJECTION, SOLUTION INTRAVENOUS ONCE
Refills: 0 | Status: COMPLETED | OUTPATIENT
Start: 2023-08-08 | End: 2023-08-08

## 2023-08-08 RX ORDER — DEXAMETHASONE 0.5 MG/5ML
10 ELIXIR ORAL ONCE
Refills: 0 | Status: COMPLETED | OUTPATIENT
Start: 2023-08-08 | End: 2023-08-08

## 2023-08-08 RX ORDER — HYDROMORPHONE HYDROCHLORIDE 2 MG/ML
1 INJECTION INTRAMUSCULAR; INTRAVENOUS; SUBCUTANEOUS EVERY 6 HOURS
Refills: 0 | Status: DISCONTINUED | OUTPATIENT
Start: 2023-08-08 | End: 2023-08-10

## 2023-08-08 RX ORDER — HYDROMORPHONE HYDROCHLORIDE 2 MG/ML
0.5 INJECTION INTRAMUSCULAR; INTRAVENOUS; SUBCUTANEOUS EVERY 4 HOURS
Refills: 0 | Status: DISCONTINUED | OUTPATIENT
Start: 2023-08-08 | End: 2023-08-10

## 2023-08-08 RX ORDER — DEXTROSE 50 % IN WATER 50 %
15 SYRINGE (ML) INTRAVENOUS ONCE
Refills: 0 | Status: DISCONTINUED | OUTPATIENT
Start: 2023-08-08 | End: 2023-08-10

## 2023-08-08 RX ORDER — HEPARIN SODIUM 5000 [USP'U]/ML
5000 INJECTION INTRAVENOUS; SUBCUTANEOUS EVERY 8 HOURS
Refills: 0 | Status: DISCONTINUED | OUTPATIENT
Start: 2023-08-08 | End: 2023-08-10

## 2023-08-08 RX ADMIN — ZOLPIDEM TARTRATE 5 MILLIGRAM(S): 10 TABLET ORAL at 22:44

## 2023-08-08 RX ADMIN — SENNA PLUS 2 TABLET(S): 8.6 TABLET ORAL at 22:44

## 2023-08-08 RX ADMIN — CYCLOBENZAPRINE HYDROCHLORIDE 5 MILLIGRAM(S): 10 TABLET, FILM COATED ORAL at 22:45

## 2023-08-08 RX ADMIN — Medication 1000 MILLIGRAM(S): at 12:37

## 2023-08-08 RX ADMIN — Medication 102 MILLIGRAM(S): at 17:01

## 2023-08-08 RX ADMIN — Medication 5 MILLIGRAM(S): at 15:21

## 2023-08-08 RX ADMIN — SODIUM CHLORIDE 1000 MILLILITER(S): 9 INJECTION, SOLUTION INTRAVENOUS at 18:29

## 2023-08-08 RX ADMIN — MORPHINE SULFATE 2 MILLIGRAM(S): 50 CAPSULE, EXTENDED RELEASE ORAL at 17:48

## 2023-08-08 RX ADMIN — Medication 400 MILLIGRAM(S): at 12:41

## 2023-08-08 RX ADMIN — Medication 15 MILLIGRAM(S): at 13:30

## 2023-08-08 RX ADMIN — MORPHINE SULFATE 4 MILLIGRAM(S): 50 CAPSULE, EXTENDED RELEASE ORAL at 13:10

## 2023-08-08 RX ADMIN — MORPHINE SULFATE 2 MILLIGRAM(S): 50 CAPSULE, EXTENDED RELEASE ORAL at 18:44

## 2023-08-08 RX ADMIN — HYDROMORPHONE HYDROCHLORIDE 1 MILLIGRAM(S): 2 INJECTION INTRAMUSCULAR; INTRAVENOUS; SUBCUTANEOUS at 20:47

## 2023-08-08 RX ADMIN — MORPHINE SULFATE 4 MILLIGRAM(S): 50 CAPSULE, EXTENDED RELEASE ORAL at 12:40

## 2023-08-08 RX ADMIN — Medication 15 MILLIGRAM(S): at 15:43

## 2023-08-08 NOTE — ED PROVIDER NOTE - PROGRESS NOTE DETAILS
Pt without relief of any pain despite multiple doses of pain medication. Pt unable to stand, walk or sit up due to the pain. Lumbar xray showing degenerative disc disease, otherwise unremarkable. No concern for cauda equina. Will admit for pain control and PT. Pt agrees to plan -Odette Em PA-C Attending MD Castro: Patient feeling unimproved, reports has been npo all day, reports significant pain sitting up, requesting IVFs, will give IVFs and additional pain control.  Admitted to Dr. De Oliveira. Attending MD Castro: Received call from Dr. Dayan Mchugh, reports received call from patient requesting he be admitted to her service.  No previous mention of this request.  Dr. De Oliveira called and amenable to this change.  Admitting called and requested admitting attending change.  Report change completed.  Patient made aware of change. Attending MD Castro: Patient feeling unimproved, reports has been npo all day, reports significant pain sitting up, requesting IVFs, will give IVFs and additional pain control.  Discussed admission, amenable.  Discussed case with Dr. De Oliveira, admitted to her service.

## 2023-08-08 NOTE — ED PROVIDER NOTE - NS ED ROS FT
Constitutional: No fever or chills  Eyes: No visual changes, eye pain or redness  HEENT: No throat pain, ear pain, nasal pain. No nose bleeding.  CV: No chest pain or lower extremity edema  Resp: No SOB no cough  GI: No abd pain. No nausea or vomiting. No diarrhea. No constipation.   : No dysuria, hematuria.   MSK: +L lower back pain radiating down LLE  Skin: No rash  Neuro: No headache. No numbness or tingling. No weakness.

## 2023-08-08 NOTE — H&P ADULT - HISTORY OF PRESENT ILLNESS
59 y/o M     with hx of CAD, HLD, DM    presenting with L lower back pain radiating down LLE starting at 8:30am this morning   . Pt reports pain began after standing up from a chair, denies any trauma, falls or heavy lifting. Notes pain radiates down the anterior leg and into the top of foot.    Pt states he has a hx of chronic intermittent lower back pain but has never been diagnosed with sciatica and has never felt pain as severe as this.     Took Aleve at home without relief.     Denies fevers, numbness, tingling, weakness, saddle anesthesia, urinary/bowel incontinence, neck pain, abd pain, nausea

## 2023-08-08 NOTE — H&P ADULT - NSHPPHYSICALEXAM_GEN_ALL_CORE
T(C): 36.5 (08-08-23 @ 19:43), Max: 36.8 (08-08-23 @ 14:31)  HR: 69 (08-08-23 @ 19:43) (65 - 71)  BP: 132/81 (08-08-23 @ 19:43) (118/70 - 142/86)  RR: 17 (08-08-23 @ 19:43) (16 - 18)  SpO2: 98% (08-08-23 @ 19:43) (98% - 100%)  GENERAL: NAD, lying in bed comfortably  HEAD:  Atraumatic, normocephalic  EYES: EOMI, PERRLA, conjunctiva and sclera clear  NECK: Supple, trachea midline, no JVD  HEART: Regular rate and rhythm, no murmurs, rubs, or gallops  LUNGS: Unlabored respirations.  Clear to auscultation bilaterally, no crackles, wheezing, or rhonchi  ABDOMEN: Soft, nontender, nondistended, +BS  EXTREMITIES: 2+ peripheral pulses bilaterally. No clubbing, cyanosis, or edema  NERVOUS SYSTEM:  no focal deficits   SKIN: No rashes or lesions

## 2023-08-08 NOTE — ED PROVIDER NOTE - CLINICAL SUMMARY MEDICAL DECISION MAKING FREE TEXT BOX
57 y/o M here with acute atraumatic L lower back pain with radiation down LLE, consistent with sciatica. No red flags signs or symptoms. Given pain control and will reassess symptoms. Will obtain xray lumbar spine for baseline, r/o osseous abnormality although low suspicion. Will need follow up with PCP and/or spine for possible MRI and physical therapy.

## 2023-08-08 NOTE — ED ADULT NURSE REASSESSMENT NOTE - NS ED NURSE REASSESS COMMENT FT1
received report from GET Call @ [id number]. pt appears comfortable lying flat at rest in stretcher with appropriate side rails up for safety. pt is A&Ox3, breathing even and unlabored, VSS, NAD noted at this time. pt states he is currently relieved of pain from previously administered medications and is comfortable at rest, declines ordered pain medication at this time. Patient admitted to medicine, Admission band in place. Pending bed, patient updated on plan of care.

## 2023-08-08 NOTE — ED PROVIDER NOTE - ATTENDING APP SHARED VISIT CONTRIBUTION OF CARE
59 yo male p/w atraumatic low back pain radiating down L leg.  no fevers, no recent surgery.  uncomfortable on exam but able to bear weight (with pain) to stand for x-ray.  no incontinence.  x-ray without fracture or abnormal bony lesion to my eye, pending radiology read.  s/p morphine, ofirmev, toradol.  d/w pain management (Post); will trial dexamethasone and reassess.  not cauda equina.

## 2023-08-08 NOTE — ED PROVIDER NOTE - SHIFT CHANGE DETAILS
Attending MD Castro: 58M w sciatica, here with lower back pain radiating to LLE, has already received multiple meds, XR nonact, case discussed with Dr. Post of pain management who suggested 10mg Decadron, if unimproved after Decadron (given 5pm), will consider TBA for pain control

## 2023-08-08 NOTE — PATIENT PROFILE ADULT - FALL HARM RISK - RISK INTERVENTIONS
Bed in lowest position, wheels locked, appropriate side rails in place/Call bell, personal items and telephone in reach/Instruct patient to call for assistance before getting out of bed or chair/Non-slip footwear when patient is out of bed/[place] to call system/Physically safe environment - no spills, clutter or unnecessary equipment/Purposeful Proactive Rounding/Room/bathroom lighting operational, light cord in reach

## 2023-08-08 NOTE — H&P ADULT - ASSESSMENT
57 y/o M     with hx of CAD, HLD, DM    presenting with L lower back pain radiating down LLE starting at 8:30am this morning    pain began after standing up from a chair, denies any trauma, falls or heavy lifting. Notes pain radiates down the anterior leg and into the top of foot.     has a hx of chronic intermittent lower back pain but has never been diagnosed with sciatica and has never felt pain as severe as this.   denies fevers, numbness, tingling, weakness, saddle anesthesia, urinary/bowel incontinence, neck pain,      admitted  with  back pain/ sciatica   L leg/ lumbar   radiculopathy    pain meds/  flexeril  xray.  deg dose ase / L5/S1  CAD/  HLD    DM,  follow   fs    on  dvt  ppx/ N/S  eval    mri  spine       ra< from: Xray Lumbar Spine AP + Lateral (08.08.23 @ 13:35) >  IMPRESSION:  1. Mild scoliosis.  2. Advanced degenerative changes L5-S1 disc space. Mild to moderate   degenerative changes L4-5 disc space.  3. No acute fracture or destructive lesion identified.  If there are radicular symptoms, MR imaging may be considered for further   assessment.  --- End of Report ---

## 2023-08-08 NOTE — ED PROVIDER NOTE - OBJECTIVE STATEMENT
57 y/o M with hx of CAD, HLD, DM presenting with L lower back pain radiating down LLE starting at 8:30am this morning. Pt reports pain began after standing up from a chair, denies any trauma, falls or heavy lifting. Notes pain radiates down the anterior leg and into the top of foot. Pt states he has a hx of chronic intermittent lower back pain but has never been diagnosed with sciatica and has never felt pain as severe as this. Took Aleve at home without relief. Denies fevers, numbness, tingling, weakness, saddle anesthesia, urinary/bowel incontinence, neck pain, abd pain, nausea, vomiting, urinary complaints.

## 2023-08-08 NOTE — ED ADULT NURSE NOTE - OBJECTIVE STATEMENT
57 y/o male arrives to the ER complaining of back pan.  Pt reports developing left sciatica pain for the last 3hrs. Pt denies any recent trauma, any numbness or tingling on lower extremities. On assessment pt is well appearing, A&Ox4, speaking coherently, airway  is patent, breathing spontaneously and unlabored. Skin is dry, warm. Abdomen is soft, no distended, no tender. Full ROM in all extremities. Peripheral pulses are strong and equal in bilateral extremities. Pt has equal ROM in extremities.  will continue to reassess. 59 y/o male arrives to the ER complaining of back pan.  Pt reports developing left lower back pain radiating down LLE starting pain for the last 3hrs. Reports taking Aleve without relief. Pt denies any recent trauma, any numbness or tingling on lower extremities. On assessment pt is, A&Ox4, speaking coherently, airway  is patent, breathing spontaneously and unlabored. Skin is dry, warm. Abdomen is soft, no distended, no tender. Full ROM in all extremities. Peripheral pulses are strong and equal in bilateral extremities. Pt has equal ROM in extremities.  will continue to reassess.

## 2023-08-08 NOTE — PATIENT PROFILE ADULT - NSPROMEDSADMININFO_GEN_A_NUR
[facility]  [address] [address]  Phone: [phone]  Fax: [phone]    Jeniffer Nava MD        December 22, 2017     Patient: Carli Obrien   YOB: 1993   Date of Visit: 12/19/2017       To Whom it May Concern:    Kyle Schmidt was seen in my office on 12/19/2017. She would benefit from having work accommodations to help her with her chronic lifelong arthritis. Please evaluate her and see if she would qualify. If you have any questions or concerns, please don't hesitate to call.     Sincerely,         Jeniffer Nava MD no concerns

## 2023-08-08 NOTE — ED PROVIDER NOTE - NS ED ATTENDING STATEMENT MOD
This was a shared visit with the GURJIT. I reviewed and verified the documentation and independently performed the documented:

## 2023-08-08 NOTE — PATIENT PROFILE ADULT - FUNCTIONAL ASSESSMENT - BASIC MOBILITY 6.
3-calculated by average/Not able to assess (calculate score using [facility] averaging method)

## 2023-08-08 NOTE — ED PROVIDER NOTE - PHYSICAL EXAMINATION
General: A&O x 3, well appearing, awake, and in no apparent distress.  HEENT: Normocephalic, atraumatic. Airway is patent.   Cardiac: Normal rate, regular rhythm.  Heart sounds S1, S2.  No murmurs, rubs or gallops.  Lungs: Breath sounds clear and equal bilaterally.  Abd: Abdomen soft, non-tender, no guarding.  Neuro: no focal deficits. Strength and sensation intact.   MSK: No midline lumbar tenderness or deformity. No paralumbar or L hip tenderness. Full ROM of hip. 2+ DP pulses, sensation intact distal LLE, 5/5 strength with plantar flexion. +SLR on left.

## 2023-08-09 LAB
A1C WITH ESTIMATED AVERAGE GLUCOSE RESULT: 6.7 % — HIGH (ref 4–5.6)
BASOPHILS # BLD AUTO: 0.01 K/UL — SIGNIFICANT CHANGE UP (ref 0–0.2)
BASOPHILS NFR BLD AUTO: 0.1 % — SIGNIFICANT CHANGE UP (ref 0–2)
EOSINOPHIL # BLD AUTO: 0 K/UL — SIGNIFICANT CHANGE UP (ref 0–0.5)
EOSINOPHIL NFR BLD AUTO: 0 % — SIGNIFICANT CHANGE UP (ref 0–6)
ESTIMATED AVERAGE GLUCOSE: 146 MG/DL — HIGH (ref 68–114)
GLUCOSE BLDC GLUCOMTR-MCNC: 156 MG/DL — HIGH (ref 70–99)
GLUCOSE BLDC GLUCOMTR-MCNC: 164 MG/DL — HIGH (ref 70–99)
GLUCOSE BLDC GLUCOMTR-MCNC: 210 MG/DL — HIGH (ref 70–99)
HCT VFR BLD CALC: 41 % — SIGNIFICANT CHANGE UP (ref 39–50)
HCV AB S/CO SERPL IA: 0.06 S/CO — SIGNIFICANT CHANGE UP (ref 0–0.99)
HCV AB SERPL-IMP: SIGNIFICANT CHANGE UP
HGB BLD-MCNC: 14.2 G/DL — SIGNIFICANT CHANGE UP (ref 13–17)
IMM GRANULOCYTES NFR BLD AUTO: 0.6 % — SIGNIFICANT CHANGE UP (ref 0–0.9)
LYMPHOCYTES # BLD AUTO: 0.92 K/UL — LOW (ref 1–3.3)
LYMPHOCYTES # BLD AUTO: 7.4 % — LOW (ref 13–44)
MCHC RBC-ENTMCNC: 30.6 PG — SIGNIFICANT CHANGE UP (ref 27–34)
MCHC RBC-ENTMCNC: 34.6 GM/DL — SIGNIFICANT CHANGE UP (ref 32–36)
MCV RBC AUTO: 88.4 FL — SIGNIFICANT CHANGE UP (ref 80–100)
MONOCYTES # BLD AUTO: 0.37 K/UL — SIGNIFICANT CHANGE UP (ref 0–0.9)
MONOCYTES NFR BLD AUTO: 3 % — SIGNIFICANT CHANGE UP (ref 2–14)
NEUTROPHILS # BLD AUTO: 11.03 K/UL — HIGH (ref 1.8–7.4)
NEUTROPHILS NFR BLD AUTO: 88.9 % — HIGH (ref 43–77)
NRBC # BLD: 0 /100 WBCS — SIGNIFICANT CHANGE UP (ref 0–0)
PLATELET # BLD AUTO: 242 K/UL — SIGNIFICANT CHANGE UP (ref 150–400)
RBC # BLD: 4.64 M/UL — SIGNIFICANT CHANGE UP (ref 4.2–5.8)
RBC # FLD: 12.8 % — SIGNIFICANT CHANGE UP (ref 10.3–14.5)
SARS-COV-2 RNA SPEC QL NAA+PROBE: SIGNIFICANT CHANGE UP
WBC # BLD: 12.41 K/UL — HIGH (ref 3.8–10.5)
WBC # FLD AUTO: 12.41 K/UL — HIGH (ref 3.8–10.5)

## 2023-08-09 PROCEDURE — 72148 MRI LUMBAR SPINE W/O DYE: CPT | Mod: 26

## 2023-08-09 PROCEDURE — 72146 MRI CHEST SPINE W/O DYE: CPT | Mod: 26

## 2023-08-09 RX ORDER — MEN-PHOR .5; .5 G/G; G/G
1 LOTION TOPICAL EVERY 6 HOURS
Refills: 0 | Status: DISCONTINUED | OUTPATIENT
Start: 2023-08-09 | End: 2023-08-10

## 2023-08-09 RX ORDER — DEXAMETHASONE 0.5 MG/5ML
4 ELIXIR ORAL
Refills: 0 | Status: DISCONTINUED | OUTPATIENT
Start: 2023-08-09 | End: 2023-08-10

## 2023-08-09 RX ORDER — CHLORHEXIDINE GLUCONATE 213 G/1000ML
1 SOLUTION TOPICAL DAILY
Refills: 0 | Status: DISCONTINUED | OUTPATIENT
Start: 2023-08-09 | End: 2023-08-10

## 2023-08-09 RX ORDER — LIDOCAINE AND PRILOCAINE CREAM 25; 25 MG/G; MG/G
1 CREAM TOPICAL DAILY
Refills: 0 | Status: DISCONTINUED | OUTPATIENT
Start: 2023-08-09 | End: 2023-08-10

## 2023-08-09 RX ORDER — LIDOCAINE 4 G/100G
1 CREAM TOPICAL DAILY
Refills: 0 | Status: DISCONTINUED | OUTPATIENT
Start: 2023-08-09 | End: 2023-08-10

## 2023-08-09 RX ORDER — INSULIN LISPRO 100/ML
VIAL (ML) SUBCUTANEOUS
Refills: 0 | Status: DISCONTINUED | OUTPATIENT
Start: 2023-08-09 | End: 2023-08-10

## 2023-08-09 RX ADMIN — HYDROMORPHONE HYDROCHLORIDE 1 MILLIGRAM(S): 2 INJECTION INTRAMUSCULAR; INTRAVENOUS; SUBCUTANEOUS at 21:57

## 2023-08-09 RX ADMIN — HEPARIN SODIUM 5000 UNIT(S): 5000 INJECTION INTRAVENOUS; SUBCUTANEOUS at 13:21

## 2023-08-09 RX ADMIN — Medication 1: at 18:02

## 2023-08-09 RX ADMIN — CYCLOBENZAPRINE HYDROCHLORIDE 5 MILLIGRAM(S): 10 TABLET, FILM COATED ORAL at 21:58

## 2023-08-09 RX ADMIN — Medication 81 MILLIGRAM(S): at 13:22

## 2023-08-09 RX ADMIN — Medication 4 MILLIGRAM(S): at 17:13

## 2023-08-09 RX ADMIN — CHLORHEXIDINE GLUCONATE 1 APPLICATION(S): 213 SOLUTION TOPICAL at 17:13

## 2023-08-09 RX ADMIN — HYDROMORPHONE HYDROCHLORIDE 1 MILLIGRAM(S): 2 INJECTION INTRAMUSCULAR; INTRAVENOUS; SUBCUTANEOUS at 22:27

## 2023-08-09 RX ADMIN — HYDROMORPHONE HYDROCHLORIDE 1 MILLIGRAM(S): 2 INJECTION INTRAMUSCULAR; INTRAVENOUS; SUBCUTANEOUS at 10:30

## 2023-08-09 RX ADMIN — CYCLOBENZAPRINE HYDROCHLORIDE 5 MILLIGRAM(S): 10 TABLET, FILM COATED ORAL at 13:22

## 2023-08-09 RX ADMIN — CYCLOBENZAPRINE HYDROCHLORIDE 5 MILLIGRAM(S): 10 TABLET, FILM COATED ORAL at 06:14

## 2023-08-09 RX ADMIN — Medication 1 MILLIGRAM(S): at 08:32

## 2023-08-09 RX ADMIN — PANTOPRAZOLE SODIUM 40 MILLIGRAM(S): 20 TABLET, DELAYED RELEASE ORAL at 06:15

## 2023-08-09 RX ADMIN — SENNA PLUS 2 TABLET(S): 8.6 TABLET ORAL at 21:58

## 2023-08-09 RX ADMIN — HYDROMORPHONE HYDROCHLORIDE 1 MILLIGRAM(S): 2 INJECTION INTRAMUSCULAR; INTRAVENOUS; SUBCUTANEOUS at 11:45

## 2023-08-09 RX ADMIN — HYDROMORPHONE HYDROCHLORIDE 0.5 MILLIGRAM(S): 2 INJECTION INTRAMUSCULAR; INTRAVENOUS; SUBCUTANEOUS at 06:14

## 2023-08-09 RX ADMIN — ATORVASTATIN CALCIUM 20 MILLIGRAM(S): 80 TABLET, FILM COATED ORAL at 21:58

## 2023-08-09 RX ADMIN — HYDROMORPHONE HYDROCHLORIDE 1 MILLIGRAM(S): 2 INJECTION INTRAMUSCULAR; INTRAVENOUS; SUBCUTANEOUS at 02:06

## 2023-08-09 NOTE — PROGRESS NOTE ADULT - SUBJECTIVE AND OBJECTIVE BOX
date of service: 08-09-23 @ 08:37  afberile  REVIEW OF SYSTEMS:  CONSTITUTIONAL: No fever,  no  weight loss  ENT:  No  tinnitus,   no   vertigo  NECK: No pain or stiffness  RESPIRATORY: No cough, wheezing, chills or hemoptysis;    No Shortness of Breath  CARDIOVASCULAR: No chest pain, palpitations, dizziness  GASTROINTESTINAL: No abdominal or epigastric pain. No nausea, vomiting, or hematemesis; No diarrhea  No melena or hematochezia.  GENITOURINARY: No dysuria, frequency, hematuria, or incontinence  NEUROLOGICAL: No headaches  SKIN: No itching,  no   rash  LYMPH Nodes: No enlarged glands  ENDOCRINE: No heat or cold intolerance  MUSCULOSKELETAL: No joint pain or swelling  PSYCHIATRIC: No depression, anxiety  HEME/LYMPH: No easy bruising, or bleeding gums  ALLERGY AND IMMUNOLOGIC: No hives or eczema	    MEDICATIONS  (STANDING):  aspirin enteric coated 81 milliGRAM(s) Oral daily  atorvastatin 20 milliGRAM(s) Oral at bedtime  cyclobenzaprine 5 milliGRAM(s) Oral three times a day  dextrose 5%. 1000 milliLiter(s) (50 mL/Hr) IV Continuous <Continuous>  dextrose 5%. 1000 milliLiter(s) (100 mL/Hr) IV Continuous <Continuous>  dextrose 50% Injectable 25 Gram(s) IV Push once  dextrose 50% Injectable 25 Gram(s) IV Push once  dextrose 50% Injectable 12.5 Gram(s) IV Push once  glucagon  Injectable 1 milliGRAM(s) IntraMuscular once  heparin   Injectable 5000 Unit(s) SubCutaneous every 8 hours  insulin lispro (ADMELOG) corrective regimen sliding scale   SubCutaneous three times a day before meals  insulin lispro (ADMELOG) corrective regimen sliding scale   SubCutaneous at bedtime  insulin lispro (ADMELOG) corrective regimen sliding scale   SubCutaneous three times a day before meals  lidocaine   4% Patch 1 Patch Transdermal daily  lidocaine/prilocaine Cream 1 Application(s) Topical daily  pantoprazole    Tablet 40 milliGRAM(s) Oral before breakfast  senna 2 Tablet(s) Oral at bedtime  zolpidem 5 milliGRAM(s) Oral at bedtime    MEDICATIONS  (PRN):  camphor 0.5%/menthol 0.5% Topical Lotion 1 Application(s) Topical every 6 hours PRN Leg Pain  dextrose Oral Gel 15 Gram(s) Oral once PRN Blood Glucose LESS THAN 70 milliGRAM(s)/deciliter  HYDROmorphone  Injectable 0.5 milliGRAM(s) IV Push every 4 hours PRN Moderate Pain (4 - 6)  HYDROmorphone  Injectable 1 milliGRAM(s) IV Push every 6 hours PRN Severe Pain (7 - 10)  LORazepam     Tablet 1 milliGRAM(s) Oral once PRN pain  ondansetron Injectable 4 milliGRAM(s) IV Push every 6 hours PRN Nausea and/or Vomiting      Vital Signs Last 24 Hrs  T(C): 36.6 (09 Aug 2023 04:26), Max: 36.8 (08 Aug 2023 14:31)  T(F): 97.8 (09 Aug 2023 04:26), Max: 98.3 (08 Aug 2023 14:31)  HR: 87 (09 Aug 2023 04:26) (65 - 87)  BP: 122/61 (09 Aug 2023 04:26) (118/70 - 150/79)  BP(mean): --  RR: 18 (09 Aug 2023 04:26) (16 - 18)  SpO2: 94% (09 Aug 2023 04:26) (94% - 100%)    Parameters below as of 09 Aug 2023 04:26  Patient On (Oxygen Delivery Method): room air      CAPILLARY BLOOD GLUCOSE      POCT Blood Glucose.: 162 mg/dL (08 Aug 2023 22:15)    I&O's Summary        Appearance: Normal	  HEENT:   Normal oral mucosa, PERRL, EOMI	  Lymphatic: No lymphadenopathy  Cardiovascular: Normal S1 S2, No JVD  Respiratory: Lungs clear to auscultation	  Gastrointestinal:  Soft, Non-tender, + BS	  Skin: No rash, No ecchymoses	  Extremities:     LABS:                        14.2   12.41 )-----------( 242      ( 09 Aug 2023 06:32 )             41.0                                   Consultant(s) Notes Reviewed:      Care Discussed with Consultants/Other Providers:

## 2023-08-09 NOTE — PROGRESS NOTE ADULT - SUBJECTIVE AND OBJECTIVE BOX
Arturo Ying, PGY-1  Please contact on Teams    HERBIE FAIR  58y  Male    Reason for Admission:       SUBJECTIVE/INTERVAL EVENTS: No acute events. Pt seen and examined at bedside.    PHYSICAL EXAM:  GENERAL: NAD, lying in bed comfortably  HEAD:  Atraumatic, normocephalic  EYES: EOMI, PERRLA, conjunctiva and sclera clear  NECK: Supple, trachea midline, no JVD  HEART: Regular rate and rhythm, no murmurs, rubs, or gallops  LUNGS: Unlabored respirations.  Clear to auscultation bilaterally, no crackles, wheezing, or rhonchi  ABDOMEN: Soft, nontender, nondistended, +BS  EXTREMITIES: 2+ peripheral pulses bilaterally. No clubbing, cyanosis, or edema  NERVOUS SYSTEM:  no focal deficits   SKIN: No rashes or lesions    Vital Signs Last 24 Hrs  T(C): 36.6 (09 Aug 2023 04:26), Max: 36.8 (08 Aug 2023 14:31)  T(F): 97.8 (09 Aug 2023 04:26), Max: 98.3 (08 Aug 2023 14:31)  HR: 87 (09 Aug 2023 04:26) (65 - 87)  BP: 122/61 (09 Aug 2023 04:26) (118/70 - 150/79)  BP(mean): --  RR: 18 (09 Aug 2023 04:26) (16 - 18)  SpO2: 94% (09 Aug 2023 04:26) (94% - 100%)    Parameters below as of 09 Aug 2023 04:26  Patient On (Oxygen Delivery Method): room air        Consultant(s) Notes Reviewed:  [x] YES  [ ] NO  Care Discussed with Consultants/Other Providers [x] YES  [ ] NO    LABS:                        14.2   12.41 )-----------( 242      ( 09 Aug 2023 06:32 )             41.0             RADIOLOGY & ADDITIONAL TESTS:    Imaging Personally Reviewed:  [x] YES  [ ] NO    MEDICATIONS  (STANDING):  aspirin enteric coated 81 milliGRAM(s) Oral daily  atorvastatin 20 milliGRAM(s) Oral at bedtime  cyclobenzaprine 5 milliGRAM(s) Oral three times a day  dextrose 5%. 1000 milliLiter(s) (100 mL/Hr) IV Continuous <Continuous>  dextrose 5%. 1000 milliLiter(s) (50 mL/Hr) IV Continuous <Continuous>  dextrose 50% Injectable 25 Gram(s) IV Push once  dextrose 50% Injectable 12.5 Gram(s) IV Push once  dextrose 50% Injectable 25 Gram(s) IV Push once  glucagon  Injectable 1 milliGRAM(s) IntraMuscular once  heparin   Injectable 5000 Unit(s) SubCutaneous every 8 hours  insulin lispro (ADMELOG) corrective regimen sliding scale   SubCutaneous three times a day before meals  insulin lispro (ADMELOG) corrective regimen sliding scale   SubCutaneous three times a day before meals  insulin lispro (ADMELOG) corrective regimen sliding scale   SubCutaneous at bedtime  LORazepam     Tablet 1 milliGRAM(s) Oral once  pantoprazole    Tablet 40 milliGRAM(s) Oral before breakfast  senna 2 Tablet(s) Oral at bedtime  zolpidem 5 milliGRAM(s) Oral at bedtime    MEDICATIONS  (PRN):  dextrose Oral Gel 15 Gram(s) Oral once PRN Blood Glucose LESS THAN 70 milliGRAM(s)/deciliter  HYDROmorphone  Injectable 1 milliGRAM(s) IV Push every 6 hours PRN Severe Pain (7 - 10)  HYDROmorphone  Injectable 0.5 milliGRAM(s) IV Push every 4 hours PRN Moderate Pain (4 - 6)  LORazepam     Tablet 1 milliGRAM(s) Oral once PRN pain  ondansetron Injectable 4 milliGRAM(s) IV Push every 6 hours PRN Nausea and/or Vomiting    < from: Xray Lumbar Spine AP + Lateral (08.08.23 @ 13:35) >  IMPRESSION:  1. Mild scoliosis.  2. Advanced degenerative changes L5-S1 disc space. Mild to moderate   degenerative changes L4-5 disc space.  3. No acute fracture or destructive lesion identified.  If there are radicular symptoms, MR imaging may be considered for further   assessment.  --- End of Report ---

## 2023-08-09 NOTE — PHYSICAL THERAPY INITIAL EVALUATION ADULT - MANUAL MUSCLE TESTING RESULTS, REHAB EVAL
Bilateral UE 5/5 strength.   strength WNL.  Right LE knee extension ankle DF 5/5.  Left LE knee extension ankle DF 4/5 strength.

## 2023-08-09 NOTE — PHYSICAL THERAPY INITIAL EVALUATION ADULT - LIGHT TOUCH SENSATION, LLE, REHAB EVAL
Pt endorses numbness and tingling joel-medial side of L foot.  Decreased sensation to light touch as compared to R LE.

## 2023-08-09 NOTE — PHYSICAL THERAPY INITIAL EVALUATION ADULT - NSPTDISCHREC_GEN_A_CORE
Discharge pt to home, as pt demonstrated abilities to ambulate and negotiate stairs safely and independently.  Pt without further PT needs in hospital setting.  Pt recommended for OPT for LBP and improving L LE strength./Outpatient PT

## 2023-08-09 NOTE — PHYSICAL THERAPY INITIAL EVALUATION ADULT - PERTINENT HX OF CURRENT PROBLEM, REHAB EVAL
Pt is a 58 year old male with PMH significant for CAD, HLD, DM.  Pt presents with L lower back pain radiating down LLE starting on 8/8 at 8:30am.  Pt reports pain began after standing up from a chair, denies any trauma, falls or heavy lifting. Pt notes pain radiates down the anterior leg and into the top of foot.  Pt reports hx of chronic intermittent lower back pain but has never been diagnosed with sciatica and has never felt pain as severe as this. Pt reports taking Aleve at home without relief.  Pt denies fevers, numbness, tingling, weakness, saddle anesthesia, urinary/bowel incontinence, neck pain, abd pain, nausea.    CT L-spine (8/8): Mild scoliosis. Advanced degenerative changes L5-S1 disc space. Mild to moderate degenerative changes L4-5 disc space.  No acute fracture or destructive lesion identified.  If there are radicular symptoms, MR imaging may be considered for further assessment. Pt is a 58 year old male with PMH significant for CAD, HLD, DM.  Pt presents with L lower back pain radiating down LLE starting on 8/8 at 8:30am.  Pt reports pain began after standing up from a chair, denies any trauma, falls or heavy lifting. Pt notes pain radiates down the anterior leg and into the top of foot.  Pt reports hx of chronic intermittent lower back pain but has never been diagnosed with sciatica and has never felt pain as severe as this. Pt reports taking Aleve at home without relief.  Pt denies fevers, numbness, tingling, weakness, saddle anesthesia, urinary/bowel incontinence, neck pain, abd pain, nausea.    CT L-spine (8/8): Mild scoliosis. Advanced degenerative changes L5-S1 disc space. Mild to moderate degenerative changes L4-5 disc space.  No acute fracture or destructive lesion identified.  If there are radicular symptoms, MR imaging may be considered for further assessment.  MRI (8/9): Multilevel degenerative changes of the thoracic and lumbar spine

## 2023-08-09 NOTE — PHYSICAL THERAPY INITIAL EVALUATION ADULT - ADDITIONAL COMMENTS
Pt lives in a house with spouse and 2 AWAIS.  Pt is independent with ambulation and ADLs, does not own any DME. Pt lives in a house with spouse and 2 AWAIS without handrails, +1 flight to the bedroom with bilateral handrails.  Pt is independent with ambulation and ADLs, owns a cane but does not use it.  Pt is Right handed.

## 2023-08-09 NOTE — PHYSICAL THERAPY INITIAL EVALUATION ADULT - WEIGHT-BEARING RESTRICTIONS: SIT/STAND, REHAB EVAL
[FreeTextEntry1] : arm 122 105  83\par left ankle 180  144   88\par right ankle  173  120  91 hr 81\par l radha 1.48, right 1.42  \par Patient underwent screening for arterial vascular disease using a [employer] diagnostic machine. Ankle brachial index was obtained using blood pressure cuff readings of the brachial arm and ankles of both legs. \par The distal pulse volume recording was noted digitally on the device. \par The procedure was supervised and interpreted by the physician ( Dr. Arce)\par upper extremity (right/left)                     wave form   triphasic\par RADHA right lower extremity-             wave form  di or triphasic\par RADHA left lower extremity                wave form /di/ triphasic\par Patient tolerated procedure well. Results reviewed with the patient.
L LE/weight-bearing as tolerated

## 2023-08-09 NOTE — CONSULT NOTE ADULT - SUBJECTIVE AND OBJECTIVE BOX
Orthopedic Spine Consult Note    Patient is a 58y Male who presents with lower back pain radiating to L leg for past day. Pt reports getting up from chair provoking back pain. Denies falls or trauma. Denies HS/LOC. Denies pain/injury elsewhere. Denies numbness/tingling/paresthesias/weakness. Denies bowel/bladder incontinence. No saddle anesthesia. Denies fevers/chills. Difficulty walking due to pain. Has never had similar episode of pain in past.  No other complaints at this time.    HEALTH ISSUES - PROBLEM Dx:          MEDICATIONS  (STANDING):  aspirin enteric coated 81 milliGRAM(s) Oral daily  atorvastatin 20 milliGRAM(s) Oral at bedtime  cyclobenzaprine 5 milliGRAM(s) Oral three times a day  dextrose 5%. 1000 milliLiter(s) IV Continuous <Continuous>  dextrose 5%. 1000 milliLiter(s) IV Continuous <Continuous>  dextrose 50% Injectable 25 Gram(s) IV Push once  dextrose 50% Injectable 25 Gram(s) IV Push once  dextrose 50% Injectable 12.5 Gram(s) IV Push once  glucagon  Injectable 1 milliGRAM(s) IntraMuscular once  heparin   Injectable 5000 Unit(s) SubCutaneous every 8 hours  insulin lispro (ADMELOG) corrective regimen sliding scale   SubCutaneous three times a day before meals  insulin lispro (ADMELOG) corrective regimen sliding scale   SubCutaneous three times a day before meals  insulin lispro (ADMELOG) corrective regimen sliding scale   SubCutaneous at bedtime  LORazepam     Tablet 1 milliGRAM(s) Oral once  pantoprazole    Tablet 40 milliGRAM(s) Oral before breakfast  senna 2 Tablet(s) Oral at bedtime  zolpidem 5 milliGRAM(s) Oral at bedtime      Allergies    No Known Allergies    Intolerances        PAST MEDICAL & SURGICAL HISTORY:  HLD (hyperlipidemia)    CAD (coronary artery disease)    DM (diabetes mellitus)                            Vital Signs Last 24 Hrs  T(C): 36.4 (08-08-23 @ 21:49), Max: 36.8 (08-08-23 @ 14:31)  T(F): 97.6 (08-08-23 @ 21:49), Max: 98.3 (08-08-23 @ 14:31)  HR: 83 (08-08-23 @ 21:49) (65 - 83)  BP: 125/70 (08-08-23 @ 21:49) (118/70 - 150/79)  BP(mean): --  RR: 18 (08-08-23 @ 21:49) (16 - 18)  SpO2: 99% (08-08-23 @ 21:49) (96% - 100%)      Physical exam  Gen: NAD  Resp: no increased WOB on RA  Spine PE:  Skin intact  No gross deformity  No midnline TTP C/T/L/S spine  No bony step offs  No paraspinal muscle ttp/hypertonicity   Negative clonus  Negative babinski  Negative winters  No saddle anesthesia    Motor:                   C5                C6              C7               C8           T1   R            5/5                5/5            5/5             5/5          5/5  L             5/5               5/5             5/5             5/5          5/5                L2             L3             L4               L5            S1  R         5/5           5/5          5/5             5/5           5/5  L          5/5          5/5           5/5             5/5           5/5    Sensory:            C5         C6         C7      C8       T1        (0=absent, 1=impaired, 2=normal, NT=not testable)  R         2            2           2        2         2  L          2            2           2        2         2               L2          L3         L4      L5       S1         (0=absent, 1=impaired, 2=normal, NT=not testable)  R         2            2            2        2        2  L          2            2           2        2         2    Imaging:    A/P: 58y Male with lower back pain likely due to lumbar radiculopathy. Will obtain MRI T/L spine  Pain control  WBAT with assistive devices as needed  FU Labs/imaging  SCDs

## 2023-08-10 ENCOUNTER — NON-APPOINTMENT (OUTPATIENT)
Age: 58
End: 2023-08-10

## 2023-08-10 ENCOUNTER — TRANSCRIPTION ENCOUNTER (OUTPATIENT)
Age: 58
End: 2023-08-10

## 2023-08-10 VITALS
TEMPERATURE: 98 F | OXYGEN SATURATION: 93 % | RESPIRATION RATE: 18 BRPM | HEART RATE: 72 BPM | DIASTOLIC BLOOD PRESSURE: 85 MMHG | SYSTOLIC BLOOD PRESSURE: 132 MMHG

## 2023-08-10 DIAGNOSIS — R52 PAIN, UNSPECIFIED: ICD-10-CM

## 2023-08-10 DIAGNOSIS — E11.9 TYPE 2 DIABETES MELLITUS WITHOUT COMPLICATIONS: ICD-10-CM

## 2023-08-10 DIAGNOSIS — M54.16 RADICULOPATHY, LUMBAR REGION: ICD-10-CM

## 2023-08-10 LAB
GLUCOSE BLDC GLUCOMTR-MCNC: 146 MG/DL — HIGH (ref 70–99)
GLUCOSE BLDC GLUCOMTR-MCNC: 181 MG/DL — HIGH (ref 70–99)
GLUCOSE BLDC GLUCOMTR-MCNC: 208 MG/DL — HIGH (ref 70–99)
MRSA PCR RESULT.: SIGNIFICANT CHANGE UP
S AUREUS DNA NOSE QL NAA+PROBE: DETECTED

## 2023-08-10 PROCEDURE — 87641 MR-STAPH DNA AMP PROBE: CPT

## 2023-08-10 PROCEDURE — 82962 GLUCOSE BLOOD TEST: CPT

## 2023-08-10 PROCEDURE — 96375 TX/PRO/DX INJ NEW DRUG ADDON: CPT

## 2023-08-10 PROCEDURE — 99222 1ST HOSP IP/OBS MODERATE 55: CPT

## 2023-08-10 PROCEDURE — 96374 THER/PROPH/DIAG INJ IV PUSH: CPT

## 2023-08-10 PROCEDURE — 72148 MRI LUMBAR SPINE W/O DYE: CPT

## 2023-08-10 PROCEDURE — 99285 EMERGENCY DEPT VISIT HI MDM: CPT

## 2023-08-10 PROCEDURE — 86803 HEPATITIS C AB TEST: CPT

## 2023-08-10 PROCEDURE — 36415 COLL VENOUS BLD VENIPUNCTURE: CPT

## 2023-08-10 PROCEDURE — 87640 STAPH A DNA AMP PROBE: CPT

## 2023-08-10 PROCEDURE — 97161 PT EVAL LOW COMPLEX 20 MIN: CPT

## 2023-08-10 PROCEDURE — 85025 COMPLETE CBC W/AUTO DIFF WBC: CPT

## 2023-08-10 PROCEDURE — 83036 HEMOGLOBIN GLYCOSYLATED A1C: CPT

## 2023-08-10 PROCEDURE — 72146 MRI CHEST SPINE W/O DYE: CPT

## 2023-08-10 PROCEDURE — 87635 SARS-COV-2 COVID-19 AMP PRB: CPT

## 2023-08-10 PROCEDURE — 72100 X-RAY EXAM L-S SPINE 2/3 VWS: CPT

## 2023-08-10 RX ORDER — NALOXONE HYDROCHLORIDE 4 MG/.1ML
4 SPRAY NASAL
Qty: 1 | Refills: 0
Start: 2023-08-10

## 2023-08-10 RX ORDER — LIDOCAINE AND PRILOCAINE CREAM 25; 25 MG/G; MG/G
1 CREAM TOPICAL DAILY
Refills: 0 | Status: DISCONTINUED | OUTPATIENT
Start: 2023-08-10 | End: 2023-08-10

## 2023-08-10 RX ORDER — ONDANSETRON 8 MG/1
4 TABLET, FILM COATED ORAL EVERY 6 HOURS
Refills: 0 | Status: DISCONTINUED | OUTPATIENT
Start: 2023-08-10 | End: 2023-08-10

## 2023-08-10 RX ORDER — METHOCARBAMOL 500 MG/1
750 TABLET, FILM COATED ORAL EVERY 6 HOURS
Refills: 0 | Status: DISCONTINUED | OUTPATIENT
Start: 2023-08-11 | End: 2023-08-10

## 2023-08-10 RX ORDER — HYDROMORPHONE HYDROCHLORIDE 2 MG/ML
2 INJECTION INTRAMUSCULAR; INTRAVENOUS; SUBCUTANEOUS EVERY 6 HOURS
Refills: 0 | Status: DISCONTINUED | OUTPATIENT
Start: 2023-08-10 | End: 2023-08-10

## 2023-08-10 RX ORDER — METHOCARBAMOL 500 MG/1
1 TABLET, FILM COATED ORAL
Qty: 28 | Refills: 0
Start: 2023-08-10 | End: 2023-08-16

## 2023-08-10 RX ORDER — HYDROMORPHONE HYDROCHLORIDE 2 MG/ML
1 INJECTION INTRAMUSCULAR; INTRAVENOUS; SUBCUTANEOUS EVERY 4 HOURS
Refills: 0 | Status: DISCONTINUED | OUTPATIENT
Start: 2023-08-10 | End: 2023-08-10

## 2023-08-10 RX ORDER — GABAPENTIN 400 MG/1
300 CAPSULE ORAL AT BEDTIME
Refills: 0 | Status: DISCONTINUED | OUTPATIENT
Start: 2023-08-10 | End: 2023-08-10

## 2023-08-10 RX ORDER — HYDROMORPHONE HYDROCHLORIDE 2 MG/ML
4 INJECTION INTRAMUSCULAR; INTRAVENOUS; SUBCUTANEOUS EVERY 6 HOURS
Refills: 0 | Status: DISCONTINUED | OUTPATIENT
Start: 2023-08-10 | End: 2023-08-10

## 2023-08-10 RX ORDER — HYDROMORPHONE HYDROCHLORIDE 2 MG/ML
2 INJECTION INTRAMUSCULAR; INTRAVENOUS; SUBCUTANEOUS EVERY 4 HOURS
Refills: 0 | Status: DISCONTINUED | OUTPATIENT
Start: 2023-08-10 | End: 2023-08-10

## 2023-08-10 RX ORDER — OXYCODONE AND ACETAMINOPHEN 5; 325 MG/1; MG/1
1 TABLET ORAL
Qty: 30 | Refills: 0
Start: 2023-08-10 | End: 2023-08-14

## 2023-08-10 RX ORDER — DEXAMETHASONE 0.5 MG/5ML
1 ELIXIR ORAL
Qty: 8 | Refills: 0
Start: 2023-08-10 | End: 2023-08-13

## 2023-08-10 RX ORDER — METHOCARBAMOL 500 MG/1
750 TABLET, FILM COATED ORAL EVERY 6 HOURS
Refills: 0 | Status: DISCONTINUED | OUTPATIENT
Start: 2023-08-10 | End: 2023-08-10

## 2023-08-10 RX ORDER — GABAPENTIN 400 MG/1
1 CAPSULE ORAL
Qty: 30 | Refills: 0
Start: 2023-08-10 | End: 2023-09-08

## 2023-08-10 RX ORDER — DEXAMETHASONE 0.5 MG/5ML
1 ELIXIR ORAL
Qty: 10 | Refills: 0
Start: 2023-08-10 | End: 2023-08-14

## 2023-08-10 RX ADMIN — Medication 1: at 09:17

## 2023-08-10 RX ADMIN — CYCLOBENZAPRINE HYDROCHLORIDE 5 MILLIGRAM(S): 10 TABLET, FILM COATED ORAL at 06:31

## 2023-08-10 RX ADMIN — HYDROMORPHONE HYDROCHLORIDE 4 MILLIGRAM(S): 2 INJECTION INTRAMUSCULAR; INTRAVENOUS; SUBCUTANEOUS at 10:39

## 2023-08-10 RX ADMIN — Medication 4 MILLIGRAM(S): at 06:30

## 2023-08-10 RX ADMIN — CHLORHEXIDINE GLUCONATE 1 APPLICATION(S): 213 SOLUTION TOPICAL at 11:24

## 2023-08-10 RX ADMIN — METHOCARBAMOL 750 MILLIGRAM(S): 500 TABLET, FILM COATED ORAL at 17:31

## 2023-08-10 RX ADMIN — GABAPENTIN 300 MILLIGRAM(S): 400 CAPSULE ORAL at 14:25

## 2023-08-10 RX ADMIN — PANTOPRAZOLE SODIUM 40 MILLIGRAM(S): 20 TABLET, DELAYED RELEASE ORAL at 06:30

## 2023-08-10 RX ADMIN — Medication 2: at 17:53

## 2023-08-10 RX ADMIN — HYDROMORPHONE HYDROCHLORIDE 4 MILLIGRAM(S): 2 INJECTION INTRAMUSCULAR; INTRAVENOUS; SUBCUTANEOUS at 12:09

## 2023-08-10 RX ADMIN — Medication 81 MILLIGRAM(S): at 11:23

## 2023-08-10 RX ADMIN — METHOCARBAMOL 750 MILLIGRAM(S): 500 TABLET, FILM COATED ORAL at 14:25

## 2023-08-10 RX ADMIN — Medication 4 MILLIGRAM(S): at 17:31

## 2023-08-10 NOTE — PROGRESS NOTE ADULT - ASSESSMENT
57 y/o M     with hx of CAD, HLD, DM    presenting with L lower back pain radiating down LLE starting at 8:30am this morning    pain began after standing up from a chair, denies any trauma, falls or heavy lifting. Notes pain radiates down the anterior leg and into the top of foot.     has a hx of chronic intermittent lower back pain but has never been diagnosed with sciatica and has never felt pain as severe as this.   denies fevers, numbness, tingling, weakness, saddle anesthesia, urinary/bowel incontinence, neck pain,      admitted  with  back pain/ sciatica   L leg/ lumbar   radiculopathy    pain meds/  flexeril/  dilaudid  prn  xray.  deg dose ase / L5/S1  CAD/  HLD    DM,  follow   fs    on  dvt  ppx/ N/S  eval noted    mri  spine/ pending   no  motor  weakness/  ekg  pending  wife at bedside   discussed  with team       ra< from: Xray Lumbar Spine AP + Lateral (08.08.23 @ 13:35) >  IMPRESSION:  1. Mild scoliosis.  2. Advanced degenerative changes L5-S1 disc space. Mild to moderate   degenerative changes L4-5 disc space.  3. No acute fracture or destructive lesion identified.  If there are radicular symptoms, MR imaging may be considered for further   assessment.  --- End of Report ---      
59 y/o M     with hx of CAD, HLD, DM    presenting with L lower back pain radiating down LLE starting at 8:30am this morning    pain began after standing up from a chair, denies any trauma, falls or heavy lifting. Notes pain radiates down the anterior leg and into the top of foot.     has a hx of chronic intermittent lower back pain but has never been diagnosed with sciatica and has never felt pain as severe as this.   denies fevers, numbness, tingling, weakness, saddle anesthesia, urinary/bowel incontinence, neck pain,      admitted  with  back pain/ sciatica   L leg/ lumbar   radiculopathy    pain meds/  flexeril  xray.  deg dose ase / L5/S1  CAD/  HLD    DM,  follow   fs    on  dvt  ppx/ N/S  eval    mri  spine            
57 y/o M     with hx of CAD, HLD, DM.  not on ac e inhibitir, per pt, due to low  sbp    presenting with L lower back pain radiating down LLE starting at 8:30am this morning    pain began after standing up from a chair, denies any trauma, falls or heavy lifting. Notes pain radiates down the anterior leg and into the top of foot.     has a hx of chronic intermittent lower back pain but has never been diagnosed with sciatica and has never felt pain as severe as this.   denies fevers, numbness, tingling, weakness, saddle anesthesia, urinary/bowel incontinence, neck pain,      admitted  with  back pain/ sciatica   L leg/ lumbar   radiculopathy    pain meds/  flexeril/  dilaudid  prn  xray.  deg disease  / L5/S1  CAD/  HLD    DM,  follow   fs  elevated wbc,  from decadron      on  dvt  ppx/ N/S  eval noted   no  motor  weakness    mri spine,  multi level degenerative   disease, with   contact of exiting  L  4 nerve root  on decadron   still    awaiting   pain team/  consults  were  placed  pt  has  expressed  desire to go  home today, after seen by pain team     rad< from: MR Lumbar Spine No Cont (08.09.23 @ 10:12) >  INDIVIDUAL LEVELS:  THORACIC SPINE:  T2-3: Mild disc bulge. No spinal canal or neuroforaminal stenosis.  T3-T4: Mild disc bulge. No spinal canal or neuroforaminal stenosis.  T8-T9: Right paracentral disc protrusion results in mild impingement on   the right ventral cord and mild spinal canal stenosis. No abnormal signal   within the cord at this level. No neural foraminal stenosis.  T9-T10: Right foraminal disc herniation is present resulting in narrowing   of the right neural foramen. Mild spinal canal stenosis, secondaryto   superimposed peripheral epidural fat.  T10-T11: Mild disc bulge. No spinal canal or neuroforaminal stenosis.    L1-L2: No spinal canal or neuroforaminal stenosis.  L2-L3: Mild disc bulge. No spinal canal or neuroforaminal stenosis.  L3-L4: Disc bulge extending into the left lateral recess and proximal   left neural foramen resulting in mild to moderate narrowing of the left   neural foramen. There is some haziness of the fat surrounding the left   exiting nerve root, may reflect early mild inflammation. Mild right   neural foraminal narrowing.  L4-L5: Large diffuse disc bulge extending into the left neural foramen   and facet arthropathy resulting in mild to moderate narrowing of the left   neural foramen and contact upon the exiting left L4 nerve root. There is   haziness of the fat surrounding the left exiting L4 nerve root,   suggestive of perineural inflammation. No significant right foraminal or   central spinal canal stenosis.  L5-S1: Large diffuse disc bulge with superimposed facet arthropathy   resulting in moderate bilateral foraminal stenosis without significant   central spinal canal stenosis.. No spinal canal stenosis.      IMPRESSION:  Multilevel degenerative changes of the thoracic and lumbar spine as   detailed above.    --- End of Report --     APRIL LUBIN MD; Resident Radiologist  This document has been electronically signed.  BAMBI MIGUEL MD; Attending Radiologist  This document has been electronically signed.   < end of copied text >  
57 y/o with hx of CAD, HLD, DM presenting with L lower back pain radiating down LLE c.f lumbar radiculopathy

## 2023-08-10 NOTE — DISCHARGE NOTE NURSING/CASE MANAGEMENT/SOCIAL WORK - NSDCPEPTCAREGIVEDUMATLIST _GEN_ALL_CORE
Diabetes
- No further escalation of care, prior to hospitalization patient was on active treatment   - transitioning to comfort measures only

## 2023-08-10 NOTE — PROGRESS NOTE ADULT - SUBJECTIVE AND OBJECTIVE BOX
Patient is a 58y old  Male who presents with a chief complaint of back  pain (09 Aug 2023 08:37)      SUBJECTIVE :      MEDICATIONS  (STANDING):  aspirin enteric coated 81 milliGRAM(s) Oral daily  atorvastatin 20 milliGRAM(s) Oral at bedtime  chlorhexidine 4% Liquid 1 Application(s) Topical daily  cyclobenzaprine 5 milliGRAM(s) Oral three times a day  dexAMETHasone     Tablet 4 milliGRAM(s) Oral two times a day  dextrose 5%. 1000 milliLiter(s) (100 mL/Hr) IV Continuous <Continuous>  dextrose 5%. 1000 milliLiter(s) (50 mL/Hr) IV Continuous <Continuous>  dextrose 50% Injectable 25 Gram(s) IV Push once  dextrose 50% Injectable 12.5 Gram(s) IV Push once  dextrose 50% Injectable 25 Gram(s) IV Push once  glucagon  Injectable 1 milliGRAM(s) IntraMuscular once  heparin   Injectable 5000 Unit(s) SubCutaneous every 8 hours  insulin lispro (ADMELOG) corrective regimen sliding scale   SubCutaneous three times a day before meals  insulin lispro (ADMELOG) corrective regimen sliding scale   SubCutaneous at bedtime  lidocaine   4% Patch 1 Patch Transdermal daily  lidocaine/prilocaine Cream 1 Application(s) Topical daily  pantoprazole    Tablet 40 milliGRAM(s) Oral before breakfast  senna 2 Tablet(s) Oral at bedtime  zolpidem 5 milliGRAM(s) Oral at bedtime    MEDICATIONS  (PRN):  camphor 0.5%/menthol 0.5% Topical Lotion 1 Application(s) Topical every 6 hours PRN Leg Pain  dextrose Oral Gel 15 Gram(s) Oral once PRN Blood Glucose LESS THAN 70 milliGRAM(s)/deciliter  HYDROmorphone  Injectable 1 milliGRAM(s) IV Push every 6 hours PRN Severe Pain (7 - 10)  HYDROmorphone  Injectable 0.5 milliGRAM(s) IV Push every 4 hours PRN Moderate Pain (4 - 6)  LORazepam     Tablet 1 milliGRAM(s) Oral once PRN pain  ondansetron Injectable 4 milliGRAM(s) IV Push every 6 hours PRN Nausea and/or Vomiting      CAPILLARY BLOOD GLUCOSE      POCT Blood Glucose.: 210 mg/dL (09 Aug 2023 21:52)  POCT Blood Glucose.: 164 mg/dL (09 Aug 2023 17:35)  POCT Blood Glucose.: 156 mg/dL (09 Aug 2023 10:17)    I&O's Summary      PHYSICAL EXAM:  Vital Signs Last 24 Hrs  T(C): 36.7 (10 Aug 2023 04:00), Max: 36.8 (09 Aug 2023 20:24)  T(F): 98 (10 Aug 2023 04:00), Max: 98.3 (09 Aug 2023 20:24)  HR: 78 (10 Aug 2023 04:00) (78 - 88)  BP: 116/59 (10 Aug 2023 04:00) (104/60 - 116/59)  BP(mean): --  RR: 18 (10 Aug 2023 04:00) (18 - 20)  SpO2: 94% (10 Aug 2023 04:00) (94% - 95%)    Parameters below as of 10 Aug 2023 04:00  Patient On (Oxygen Delivery Method): room air        GENERAL: NAD, lying in bed comfortably  HEAD:  Atraumatic, normocephalic  EYES: EOMI, PERRLA, conjunctiva and sclera clear  NECK: Supple, trachea midline, no JVD  HEART: Regular rate and rhythm, no murmurs, rubs, or gallops  LUNGS: Unlabored respirations.  Clear to auscultation bilaterally, no crackles, wheezing, or rhonchi  ABDOMEN: Soft, nontender, nondistended, +BS  EXTREMITIES: 2+ peripheral pulses bilaterally. No clubbing, cyanosis, or edema  NERVOUS SYSTEM:  no focal deficits   SKIN: No rashes or lesions    LABS:                        14.2   12.41 )-----------( 242      ( 09 Aug 2023 06:32 )             41.0                       RADIOLOGY & ADDITIONAL TESTS:  Results Reviewed:   Imaging Personally Reviewed:  Electrocardiogram Personally Reviewed:    COORDINATION OF CARE:  Care Discussed with Consultants/Other Providers [Y/N]:  Prior or Outpatient Records Reviewed [Y/N]:   Patient is a 58y old  Male who presents with a chief complaint of back  pain (09 Aug 2023 08:37)      SUBJECTIVE : NAEO.  Pt seen and examined at bedside. Overall pain has improved. Denies CP, SOB, fever/chills, dysuria, hematuria, diarrhea/constipation.  Wants to go home       MEDICATIONS  (STANDING):  aspirin enteric coated 81 milliGRAM(s) Oral daily  atorvastatin 20 milliGRAM(s) Oral at bedtime  chlorhexidine 4% Liquid 1 Application(s) Topical daily  cyclobenzaprine 5 milliGRAM(s) Oral three times a day  dexAMETHasone     Tablet 4 milliGRAM(s) Oral two times a day  dextrose 5%. 1000 milliLiter(s) (100 mL/Hr) IV Continuous <Continuous>  dextrose 5%. 1000 milliLiter(s) (50 mL/Hr) IV Continuous <Continuous>  dextrose 50% Injectable 25 Gram(s) IV Push once  dextrose 50% Injectable 12.5 Gram(s) IV Push once  dextrose 50% Injectable 25 Gram(s) IV Push once  glucagon  Injectable 1 milliGRAM(s) IntraMuscular once  heparin   Injectable 5000 Unit(s) SubCutaneous every 8 hours  insulin lispro (ADMELOG) corrective regimen sliding scale   SubCutaneous three times a day before meals  insulin lispro (ADMELOG) corrective regimen sliding scale   SubCutaneous at bedtime  lidocaine   4% Patch 1 Patch Transdermal daily  lidocaine/prilocaine Cream 1 Application(s) Topical daily  pantoprazole    Tablet 40 milliGRAM(s) Oral before breakfast  senna 2 Tablet(s) Oral at bedtime  zolpidem 5 milliGRAM(s) Oral at bedtime    MEDICATIONS  (PRN):  camphor 0.5%/menthol 0.5% Topical Lotion 1 Application(s) Topical every 6 hours PRN Leg Pain  dextrose Oral Gel 15 Gram(s) Oral once PRN Blood Glucose LESS THAN 70 milliGRAM(s)/deciliter  HYDROmorphone  Injectable 1 milliGRAM(s) IV Push every 6 hours PRN Severe Pain (7 - 10)  HYDROmorphone  Injectable 0.5 milliGRAM(s) IV Push every 4 hours PRN Moderate Pain (4 - 6)  LORazepam     Tablet 1 milliGRAM(s) Oral once PRN pain  ondansetron Injectable 4 milliGRAM(s) IV Push every 6 hours PRN Nausea and/or Vomiting      CAPILLARY BLOOD GLUCOSE      POCT Blood Glucose.: 210 mg/dL (09 Aug 2023 21:52)  POCT Blood Glucose.: 164 mg/dL (09 Aug 2023 17:35)  POCT Blood Glucose.: 156 mg/dL (09 Aug 2023 10:17)    I&O's Summary      PHYSICAL EXAM:  Vital Signs Last 24 Hrs  T(C): 36.7 (10 Aug 2023 04:00), Max: 36.8 (09 Aug 2023 20:24)  T(F): 98 (10 Aug 2023 04:00), Max: 98.3 (09 Aug 2023 20:24)  HR: 78 (10 Aug 2023 04:00) (78 - 88)  BP: 116/59 (10 Aug 2023 04:00) (104/60 - 116/59)  BP(mean): --  RR: 18 (10 Aug 2023 04:00) (18 - 20)  SpO2: 94% (10 Aug 2023 04:00) (94% - 95%)    Parameters below as of 10 Aug 2023 04:00  Patient On (Oxygen Delivery Method): room air        GENERAL: NAD, lying in bed comfortably  HEAD:  Atraumatic, normocephalic  EYES: EOMI, PERRLA, conjunctiva and sclera clear  NECK: Supple, trachea midline, no JVD  HEART: Regular rate and rhythm, no murmurs, rubs, or gallops  LUNGS: Unlabored respirations.  Clear to auscultation bilaterally, no crackles, wheezing, or rhonchi  ABDOMEN: Soft, nontender, nondistended, +BS  EXTREMITIES: 2+ peripheral pulses bilaterally. No clubbing, cyanosis, or edema  NERVOUS SYSTEM:  no focal deficits   SKIN: No rashes or lesions    LABS:                        14.2   12.41 )-----------( 242      ( 09 Aug 2023 06:32 )             41.0                       RADIOLOGY & ADDITIONAL TESTS:  Results Reviewed:   Imaging Personally Reviewed:  Electrocardiogram Personally Reviewed:    COORDINATION OF CARE:  Care Discussed with Consultants/Other Providers [Y/N]:  Prior or Outpatient Records Reviewed [Y/N]:

## 2023-08-10 NOTE — DISCHARGE NOTE PROVIDER - NSDCCPTREATMENT_GEN_ALL_CORE_FT
PRINCIPAL PROCEDURE  Procedure: MRI  Findings and Treatment: FINDINGS:  LOCALIZER: No additional findings.  BONES: There is no fracture. Modic type I changes of the T9-T10 anterior   endplates.  ALIGNMENT: Mild lumbar dextroscoliosis with apex at L4.  SACROILIAC JOINTS/SACRUM: There is no sacral fracture. The SI joints are   partially visualized but are intact.  CONUS AND CAUDA EQUINA: The distal cord and conus are normal in signal.   Conus terminates at L1.  VISUALIZED INTRAPELVIC/INTRA-ABDOMINAL SOFT TISSUES: Normal.  PARASPINAL SOFT TISSUES: Normal.  INDIVIDUAL LEVELS  L1-L2: No spinal canal or neuroforaminal stenosis.  L2-L3: Mild disc bulge. No spinal canal or neuroforaminal stenosis.  L3-L4: Disc bulge extending into the left lateral recess and proximal   left neural foramen resulting in mild to moderate narrowing of the left   neural foramen. There is some haziness of the fat surrounding the left   exiting nerve root, may reflect early mild inflammation. Mild right   neural foraminal narrowing.  L4-L5: Large diffuse disc bulge extending into the left neural foramen   and facet arthropathy resulting in mild to moderate narrowing of the left   neural foramen and contact upon the exiting left L4 nerve root. There is   haziness of the fat surrounding the left exiting L4 nerve root,   suggestive of perineural inflammation. No significant right foraminal or   central spinal canal stenosis.  L5-S1: Large diffuse disc bulge with superimposed facet arthropathy   resulting in moderate bilateral foraminal stenosis without significant   central spinal canal stenosis.. No spinal canal stenosis.  IMPRESSION:  Multilevel degenerative changes of the thoracic and lumbar spine as   detailed above.

## 2023-08-10 NOTE — DISCHARGE NOTE PROVIDER - NPI NUMBER (FOR [employer]YS[employer] USE ONLY) :
Patient provided verbal consent for friends or family to be in room while medical information is discussed.    [[id number]] [[id number]],[[id number]]

## 2023-08-10 NOTE — DISCHARGE NOTE NURSING/CASE MANAGEMENT/SOCIAL WORK - PATIENT PORTAL LINK FT
You can access the FollowMyHealth Patient Portal offered by [facility] by registering at the following website: [email or web address]. By joining [employer]’s FollowMyHealth portal, you will also be able to view your health information using other applications (apps) compatible with our system.

## 2023-08-10 NOTE — CONSULT NOTE ADULT - ASSESSMENT
59 y/o M with hx of CAD, HLD, DM presenting with L lower back pain radiating down LLE starting at 8:30am this morning. Pt reports pain began after standing up from a chair, denies any trauma, falls or heavy lifting. Notes pain radiates down the anterior leg and into the top of foot.  Pt states he has a hx of chronic intermittent lower back pain but has never been diagnosed with sciatica and has never felt pain as severe as this. Took Aleve at home without relief.      Current out- patient pain regimen: none  Out Patient Pain Management provider: none    Pt with LBP and LLE radiculopathy, L4-5 large disce bulge with L neural foraminal narrowing, L L4 nerve compression and perineural inflammation.    Discontinue flexeril due to ineffectiveness, start robaxin 500 mg Q 6 hours.  Discontinue PO dilaudid doses and start percocet 5/325 1 tab Q 4 hours PRN pain.  Start neurontin 300 mg QHS.    Monitor for sedation and respiratory depression.  Bowel regimen.  PT/ OOB per Medicine team.    Discussed [facility] outpatient can follow up with Dr Otf Post [phone].  Discharge with a narcan rescue kit (naloxone 4 mg/ 0.1 ml nasal spray- 1 spray Q 2-3 minutes alternating between nostrils).        Minutes spent on total encounter: 60 minutes      Chronic Pain Service  [phone]

## 2023-08-10 NOTE — CONSULT NOTE ADULT - SUBJECTIVE AND OBJECTIVE BOX
Chief Complaint:  Patient is a 58y old  Male who presents with a chief complaint of back  pain (10 Aug 2023 09:39)    HPI:  59 y/o M with hx of CAD, HLD, DM presenting with L lower back pain radiating down LLE starting at 8:30am this morning. Pt reports pain began after standing up from a chair, denies any trauma, falls or heavy lifting. Notes pain radiates down the anterior leg and into the top of foot.  Pt states he has a hx of chronic intermittent lower back pain but has never been diagnosed with sciatica and has never felt pain as severe as this. Took Aleve at home without relief.  Denies fevers, numbness, tingling, weakness, saddle anesthesia, urinary/bowel incontinence, neck pain, abd pain, nausea (08 Aug 2023 19:46)      Current Pain Score: 8/10    Current out- patient pain regimen: none    Out Patient Pain Management provider: none    [facility] Prescription Monitoring Program: Reference #[id number]    PAST MEDICAL & SURGICAL HISTORY:  HLD (hyperlipidemia)      CAD (coronary artery disease)      DM (diabetes mellitus)      SOCIAL HISTORY:  Tobacco Use: denies  Alcohol Use: denies  Recreational Marijuana: denies  Illicit Drug Use: denies    Opioid Risk Tool (ORT-OUD) Score: low      Allergies    No Known Allergies    Intolerances    None     REVIEW OF SYSTEMS:  CONSTITUTIONAL: No fever, weight loss, fatigue, falls  NEURO: No headaches, memory loss, tremors, dizziness or blurred vision  RESP: No shortness of breath, cough  CV: No chest pain, palpitations  GI: No abdominal pain, nausea, vomiting, diarrhea, constipation   : No urinary incontinence/retention, dysuria  MSK: No joint pain; + low back pain and LLE pain; no upper or lower motor strength weakness; no saddle anesthesia   SKIN: No itching, burning, rashes   PSYCHIATRIC: No depression, anxiety, mood swings, or difficulty sleeping      MEDICATIONS  (STANDING):  aspirin enteric coated 81 milliGRAM(s) Oral daily  atorvastatin 20 milliGRAM(s) Oral at bedtime  chlorhexidine 4% Liquid 1 Application(s) Topical daily  dexAMETHasone     Tablet 4 milliGRAM(s) Oral two times a day  dextrose 5%. 1000 milliLiter(s) (100 mL/Hr) IV Continuous <Continuous>  dextrose 5%. 1000 milliLiter(s) (50 mL/Hr) IV Continuous <Continuous>  dextrose 50% Injectable 25 Gram(s) IV Push once  dextrose 50% Injectable 12.5 Gram(s) IV Push once  dextrose 50% Injectable 25 Gram(s) IV Push once  gabapentin 300 milliGRAM(s) Oral at bedtime  glucagon  Injectable 1 milliGRAM(s) IntraMuscular once  heparin   Injectable 5000 Unit(s) SubCutaneous every 8 hours  insulin lispro (ADMELOG) corrective regimen sliding scale   SubCutaneous three times a day before meals  insulin lispro (ADMELOG) corrective regimen sliding scale   SubCutaneous at bedtime  lidocaine/prilocaine Cream 1 Application(s) Topical daily  methocarbamol 750 milliGRAM(s) Oral every 6 hours  pantoprazole    Tablet 40 milliGRAM(s) Oral before breakfast  senna 2 Tablet(s) Oral at bedtime  zolpidem 5 milliGRAM(s) Oral at bedtime    MEDICATIONS  (PRN):  dextrose Oral Gel 15 Gram(s) Oral once PRN Blood Glucose LESS THAN 70 milliGRAM(s)/deciliter  oxycodone    5 mG/acetaminophen 325 mG 1 Tablet(s) Oral every 4 hours PRN Severe Pain (7 - 10)      PHYSICAL EXAM  GENERAL: seen at bedside; NAD; well developed, well groomed; no signs of toxicity  HEENT: head atraumatic, normocephalic; anicteric; speech clear and fluent  NEURO: A + O X 3; good concentration; Cranial Nerves II- XII intact; SILT with decreased sensation L distal anterior leg  GI: abdomen soft, non distended; + BM; appetite good  : voiding  EXTREMITIES/ PV: ALVAREZ; 2+ peripheral pulses; no cyanosis, edema or clubbing  MUSCULOSKELETAL: motor strength 5/5 B/L PF, DF, HF; can only ambulate short distances with increased pain LLE   SKIN: no rashes, lesions or masses  PSYCH: affect normal; good eye contact; no signs of depression or anxiety  Vital Signs:  T(C): 36.7 (08-10-23 @ 12:13)  HR: 72 (08-10-23 @ 12:13)  BP: 132/85 (08-10-23 @ 12:13)  RR: 18 (08-10-23 @ 12:13)  SpO2: 93% (08-10-23 @ 12:13)    Pertinent labs/radiology:                        14.2   12.41 )-----------( 242      ( 09 Aug 2023 06:32 )             41.0       from: MR Thoracic Spine No Cont (08.09.23 @ 10:12)   INDIVIDUAL LEVELS:    THORACIC SPINE:  T2-3: Mild disc bulge. No spinal canal or neuroforaminal stenosis.  T3-T4: Mild disc bulge. No spinal canal or neuroforaminal stenosis.  T8-T9: Right paracentral disc protrusion results in mild impingement on   the right ventral cord and mild spinal canal stenosis. No abnormal signal   within the cord at this level. No neural foraminal stenosis.  T9-T10: Right foraminal disc herniation is present resulting in narrowing   of the right neural foramen. Mild spinal canal stenosis, secondary to   superimposed peripheral epidural fat.  T10-T11: Mild disc bulge. No spinal canal or neuroforaminal stenosis.    L1-L2: No spinal canal or neuroforaminal stenosis.  L2-L3: Mild disc bulge. No spinal canal or neuroforaminal stenosis.  L3-L4: Disc bulge extending into the left lateral recess and proximal   left neural foramen resulting in mild to moderate narrowing of the left   neural foramen. There is some haziness of the fat surrounding the left   exiting nerve root, may reflect early mild inflammation. Mild right   neural foraminal narrowing.  L4-L5: Large diffuse disc bulge extending into the left neural foramen   and facet arthropathy resulting in mild to moderate narrowing of the left   neural foramen and contact upon the exiting left L4 nerve root. There is   haziness of the fat surrounding the left exiting L4 nerve root,   suggestive of perineural inflammation. No significant right foraminal or   central spinal canal stenosis.  L5-S1: Large diffuse disc bulge with superimposed facet arthropathy   resulting in moderate bilateral foraminal stenosis without significant   central spinal canal stenosis.. No spinal canal stenosis.

## 2023-08-10 NOTE — PROGRESS NOTE ADULT - PROBLEM SELECTOR PLAN 1
MRI Spine :  Multilevel degenerative changes of the thoracic and lumbar spine   denies fevers, numbness, tingling, weakness, saddle anesthesia, urinary/bowel incontinence, neck pain  ortho consulted     - decadrone 4 mg BID  - Pain control as below

## 2023-08-10 NOTE — DISCHARGE NOTE PROVIDER - NSDCCPCAREPLAN_GEN_ALL_CORE_FT
PRINCIPAL DISCHARGE DIAGNOSIS  Diagnosis: Lumbosacral radiculopathy  Assessment and Plan of Treatment: You came with low  back pain with radiation down the leg with no bowel/urinary incontinence. MRI showed evidence of multilevel degenerative  disease, with   contact of exiting  L4 nerve root. You were started on decadron. Chronic pain was consulted as well who recommended     PRINCIPAL DISCHARGE DIAGNOSIS  Diagnosis: Lumbosacral radiculopathy  Assessment and Plan of Treatment: You came with low  back pain with radiation down the leg with no bowel/urinary incontinence. MRI showed evidence of multilevel degenerative  disease, with   contact of exiting  L4 nerve root. You were started on decadron. Chronic pain was consulted as well who recommended robaxin q6 PRN, gabapentin at bedtime, percocet for severe pain.   Please follow-up with the ortho team, chronic pain team and your PCP.     PRINCIPAL DISCHARGE DIAGNOSIS  Diagnosis: Lumbosacral radiculopathy  Assessment and Plan of Treatment: You came with low  back pain with radiation down the leg with no bowel/urinary incontinence. MRI showed evidence of multilevel degenerative  disease, with   contact of exiting  L4 nerve root. You were started on decadron. Chronic pain was consulted as well who recommended robaxin q6 PRN, gabapentin at bedtime, percocet for severe pain.   Please follow-up with the ortho team, chronic pain team and your PCP. Decadron schedule: 2 mg BID till 8/14 and followed by 1 mg BID starting 8/15 for 5 days.

## 2023-08-10 NOTE — PROGRESS NOTE ADULT - PROBLEM SELECTOR PLAN 2
IV dilaudid changed to PO    - Dilaudid 2 mg q4 for moderate  - dilaudid 4 mg q 6 for severe  - will d/c with 5 days of dilaudid

## 2023-08-10 NOTE — DISCHARGE NOTE PROVIDER - HOSPITAL COURSE
57 y/o M     with hx of CAD, HLD, DM.  not on ac e inhibitir, per pt, due to low  sbp    presenting with L lower back pain radiating down LLE starting at 8:30am this morning    pain began after standing up from a chair, denies any trauma, falls or heavy lifting.    Notes pain radiates down the anterior leg and into the top of foot.     has a hx of chronic intermittent lower back pain but has never been diagnosed with sciatica and has never felt pain as severe as this.   denies fevers, numbness, tingling, weakness, saddle anesthesia, urinary/bowel incontinence, neck pain,      admitted  with  back pain/ sciatica   L leg/ lumbar   radiculopathy    pain meds/  flexeril/  dilaudid  prn/ seen by spine  team  xray.  deg disease  / L5/S1  CAD/  HLD    DM,  follow   fs  elevated wbc,  from decadron      on  dvt  ppx/ N/S  eval noted   no  motor  weakness    mri spine,  multi level degenerative   disease, with   contact of exiting  L  4 nerve root  on decadron  pt  has  expressed  desire to go  home today, after seen by pain team/  f/p  annie romo   57 y/o M with hx of CAD, HLD, DM, due to acute on chronic low back pain radiating down the anterior leg and foot that was noted after standing up from the chair. No trauma or other inciting factors.    Never had pain this severe. denies fevers, numbness, tingling, weakness, saddle anesthesia, urinary/bowel incontinence, neck pain. MRI with evidence of lumbar radiculopathy; multilevel degenerative   disease, with   contact of exiting  L4 nerve root. Started on decadron and seen by ortho, 57 y/o M with hx of CAD, HLD, DM, due to acute on chronic low back pain radiating down the anterior leg and foot that was noted after standing up from the chair. No trauma or other inciting factors.    Never had pain this severe. denies fevers, numbness, tingling, weakness, saddle anesthesia, urinary/bowel incontinence, neck pain. MRI with evidence of lumbar radiculopathy; multilevel degenerative   disease, with   contactof exiting  L4 nerve root. Evaluated by ortho, started on decadron    Seen by chronic pain service and recommended Robaxin q6 PRN, gabapentin at bedtime, percocet for severe pain.     Pt is medically optimized for discharge with close f/u with ortho, chronic pain, PCP. 59 y/o M with hx of CAD, HLD, DM, due to acute on chronic low back pain radiating down the anterior leg and foot that was noted after standing up from the chair. No trauma or other inciting factors.    Never had pain this severe. denies fevers, numbness, tingling, weakness, saddle anesthesia, urinary/bowel incontinence, neck pain. MRI with evidence of lumbar radiculopathy; multilevel degenerative   disease, with   contactof exiting  L4 nerve root. Evaluated by ortho, started on decadron    Seen by chronic pain service and recommended Robaxin q6 standing on 8/10 and PRN q6 starting 8/11, gabapentin at bedtime, percocet q4 for severe pain.     Decadron schedule as follows:  2 mg BID till 8/14 and 1 mg BID starting 8/15.     Pt is medically optimized for discharge with close f/u with ortho, chronic pain, PCP.

## 2023-08-10 NOTE — DISCHARGE NOTE PROVIDER - PROVIDER TOKENS
PROVIDER:[TOKEN:[[id number]]] PROVIDER:[TOKEN:[[id number]]],PROVIDER:[TOKEN:[[id number]],FOLLOWUP:[2 weeks]]

## 2023-08-10 NOTE — DISCHARGE NOTE NURSING/CASE MANAGEMENT/SOCIAL WORK - NSDCPEFALRISK_GEN_ALL_CORE
For information on Fall & Injury Prevention, visit: [email or web address] OR  [email or web address] OR  https://www.cdc.gov/steadi/patient.html

## 2023-08-10 NOTE — DISCHARGE NOTE PROVIDER - CARE PROVIDER_API CALL
Otf Post  Pain Medicine  [address], [address]  [place] [address]  Phone: [phone]  Fax: [phone]  Follow Up Time:    Otf Post  Pain Medicine  [address], [address]  [place] [address]  Phone: [phone]  Fax: [phone]  Follow Up Time:     Mason Melton)  Orthopaedic Surgery  [address], [address]  [place] [address]  Phone: [phone]  Fax: [phone]  Follow Up Time: 2 weeks

## 2023-08-10 NOTE — DISCHARGE NOTE PROVIDER - NSDCMRMEDTOKEN_GEN_ALL_CORE_FT
aspirin 81 mg oral delayed release tablet: 1 tab(s) orally once a day  Ozempic 2 mg/1.5 mL (1 mg dose) subcutaneous solution: 1 milligram(s) subcutaneously once a week Sunday  rosuvastatin 20 mg oral tablet: 1 tab(s) orally once a day   aspirin 81 mg oral delayed release tablet: 1 tab(s) orally once a day  dexAMETHasone 1 mg oral tablet: 1 tab(s) orally 2 times a day Start taking dexamethasone 1 mg two times a day starting 8/15  dexAMETHasone 2 mg oral tablet: 1 tab(s) orally 2 times a day until 8/14  methocarbamol 750 mg oral tablet: 1 tab(s) orally every 6 hours as needed for Muscle Spasm Take Robaxin standing every 6 hours on 8/10 and starting taking PRN on 8/11 MDD: 4 tablets  Neurontin 300 mg oral capsule: 1 cap(s) orally once a day (at bedtime)  Ozempic 2 mg/1.5 mL (1 mg dose) subcutaneous solution: 1 milligram(s) subcutaneously once a week Sunday  Percocet 5 mg-325 mg oral tablet: 1 tab(s) orally every 4 hours as needed for Severe Pain (7 - 10) MDD: 6 tablets  rosuvastatin 20 mg oral tablet: 1 tab(s) orally once a day

## 2023-08-10 NOTE — DISCHARGE NOTE PROVIDER - CARE PROVIDERS DIRECT ADDRESSES
,[email or web address]

## 2023-08-10 NOTE — PROGRESS NOTE ADULT - SUBJECTIVE AND OBJECTIVE BOX
date of service: 08-10-23 @ 08:54  afberile/  has pain  REVIEW OF SYSTEMS:  CONSTITUTIONAL: No fever,  no  weight loss  ENT:  No  tinnitus,   no   vertigo  NECK: No pain or stiffness  RESPIRATORY: No cough, wheezing, chills or hemoptysis;    No Shortness of Breath  CARDIOVASCULAR: No chest pain, palpitations, dizziness  GASTROINTESTINAL: No abdominal or epigastric pain. No nausea, vomiting, or hematemesis; No diarrhea  No melena or hematochezia.  GENITOURINARY: No dysuria, frequency, hematuria, or incontinence  NEUROLOGICAL: No headaches  SKIN: No itching,  no   rash  LYMPH Nodes: No enlarged glands  ENDOCRINE: No heat or cold intolerance  MUSCULOSKELETAL: No joint pain or swelling  PSYCHIATRIC: No depression, anxiety  HEME/LYMPH: No easy bruising, or bleeding gums  ALLERGY AND IMMUNOLOGIC: No hives or eczema	    MEDICATIONS  (STANDING):  aspirin enteric coated 81 milliGRAM(s) Oral daily  atorvastatin 20 milliGRAM(s) Oral at bedtime  chlorhexidine 4% Liquid 1 Application(s) Topical daily  cyclobenzaprine 5 milliGRAM(s) Oral three times a day  dexAMETHasone     Tablet 4 milliGRAM(s) Oral two times a day  dextrose 5%. 1000 milliLiter(s) (100 mL/Hr) IV Continuous <Continuous>  dextrose 5%. 1000 milliLiter(s) (50 mL/Hr) IV Continuous <Continuous>  dextrose 50% Injectable 25 Gram(s) IV Push once  dextrose 50% Injectable 12.5 Gram(s) IV Push once  dextrose 50% Injectable 25 Gram(s) IV Push once  glucagon  Injectable 1 milliGRAM(s) IntraMuscular once  heparin   Injectable 5000 Unit(s) SubCutaneous every 8 hours  insulin lispro (ADMELOG) corrective regimen sliding scale   SubCutaneous at bedtime  insulin lispro (ADMELOG) corrective regimen sliding scale   SubCutaneous three times a day before meals  lidocaine   4% Patch 1 Patch Transdermal daily  lidocaine/prilocaine Cream 1 Application(s) Topical daily  pantoprazole    Tablet 40 milliGRAM(s) Oral before breakfast  senna 2 Tablet(s) Oral at bedtime  zolpidem 5 milliGRAM(s) Oral at bedtime    MEDICATIONS  (PRN):  dextrose Oral Gel 15 Gram(s) Oral once PRN Blood Glucose LESS THAN 70 milliGRAM(s)/deciliter  HYDROmorphone  Injectable 0.5 milliGRAM(s) IV Push every 4 hours PRN Moderate Pain (4 - 6)  HYDROmorphone  Injectable 1 milliGRAM(s) IV Push every 6 hours PRN Severe Pain (7 - 10)      Vital Signs Last 24 Hrs  T(C): 36.7 (10 Aug 2023 04:00), Max: 36.8 (09 Aug 2023 20:24)  T(F): 98 (10 Aug 2023 04:00), Max: 98.3 (09 Aug 2023 20:24)  HR: 78 (10 Aug 2023 04:00) (78 - 88)  BP: 116/59 (10 Aug 2023 04:00) (104/60 - 116/59)  BP(mean): --  RR: 18 (10 Aug 2023 04:00) (18 - 20)  SpO2: 94% (10 Aug 2023 04:00) (94% - 95%)    Parameters below as of 10 Aug 2023 04:00  Patient On (Oxygen Delivery Method): room air      CAPILLARY BLOOD GLUCOSE      POCT Blood Glucose.: 181 mg/dL (10 Aug 2023 08:31)  POCT Blood Glucose.: 210 mg/dL (09 Aug 2023 21:52)  POCT Blood Glucose.: 164 mg/dL (09 Aug 2023 17:35)  POCT Blood Glucose.: 156 mg/dL (09 Aug 2023 10:17)    I&O's Summary        Appearance: Normal	  HEENT:   Normal oral mucosa, PERRL, EOMI	  Lymphatic: No lymphadenopathy  Cardiovascular: Normal S1 S2, No JVD  Respiratory: Lungs clear to auscultation	  Gastrointestinal:  Soft, Non-tender, + BS	  Skin: No rash, No ecchymoses	  Extremities:  no leg weakness     LABS:                        14.2   12.41 )-----------( 242      ( 09 Aug 2023 06:32 )             41.0                                   Consultant(s) Notes Reviewed:      Care Discussed with Consultants/Other Providers:

## 2023-08-11 PROBLEM — I25.10 ATHEROSCLEROTIC HEART DISEASE OF NATIVE CORONARY ARTERY WITHOUT ANGINA PECTORIS: Chronic | Status: ACTIVE | Noted: 2023-08-08

## 2023-08-14 ENCOUNTER — TRANSCRIPTION ENCOUNTER (OUTPATIENT)
Age: 58
End: 2023-08-14

## 2023-08-16 ENCOUNTER — APPOINTMENT (OUTPATIENT)
Dept: PAIN MANAGEMENT | Facility: CLINIC | Age: 58
End: 2023-08-16
Payer: COMMERCIAL

## 2023-08-16 ENCOUNTER — TRANSCRIPTION ENCOUNTER (OUTPATIENT)
Age: 58
End: 2023-08-16

## 2023-08-16 VITALS — BODY MASS INDEX: 31.83 KG/M2 | HEIGHT: 68 IN | WEIGHT: 210 LBS

## 2023-08-16 DIAGNOSIS — M54.16 RADICULOPATHY, LUMBAR REGION: ICD-10-CM

## 2023-08-16 PROBLEM — E78.5 HYPERLIPIDEMIA, UNSPECIFIED: Chronic | Status: ACTIVE | Noted: 2023-08-08

## 2023-08-16 PROBLEM — E11.9 TYPE 2 DIABETES MELLITUS WITHOUT COMPLICATIONS: Chronic | Status: ACTIVE | Noted: 2023-08-08

## 2023-08-16 PROCEDURE — 99214 OFFICE O/P EST MOD 30 MIN: CPT

## 2023-08-16 NOTE — ASU PATIENT PROFILE, ADULT - NSICDXPASTSURGICALHX_GEN_ALL_CORE_FT
PAST SURGICAL HISTORY:  H/O cataract extraction      PAST SURGICAL HISTORY:  H/O cataract extraction     History of obstructive sleep apnea     History of percutaneous coronary intervention stents x2

## 2023-08-16 NOTE — ASSESSMENT
[FreeTextEntry1] : Subjective findings This 58-year-old male presents to us with pain in the back with a rating component down both legs.  He says that he has had off-and-on pain for years.  But this last attack started approximately 6 months ago and has not abated.  He also states that over the course of time this has worsened and causing significant discomfort for the patient.  He recently has resorted to the use of a cane secondary to the discomfort.  Patient denies any bowel or bladder incontinence any progressive weakness but is unable to function at this level and is looking for some relief of the pain.  The CV/Pulm/GI/Heme/Renal/Hepatic//Psych/Endo systems are reviewed. A full ROS was performed and reviewed with the patient today, see intake form.  Average pain score for the month is ?? out of ten. The patient's current medications are documented to the best of their ability. The patient has failed conservative therapies to include medical management, and physical activity to treat the pain. The patient has decreased function secondary to the pain. Objective findings Imaging studies are consistent with the patient's pain complaints.  Patient is able to get to a standing position without assistance.  Motor and sensory exams appear grossly intact.  Patient ambulates with a cane. Plan Spoke to patient about epidural steroid injections. Explained risks, benefits and alternatives including but not limited to the risk of infection, bleeding, headache, syncopal episode, failure to resolve issues, allergic reaction, symptom recurrence, allergic reaction, nerve injury, and increased pain. The patient understands the risks. All questions were answered. The patient is willing to proceed. The importance of increasing exercise after the injection is also stressed. The use of the injection as a jump start so that the patient can do more exercise, but that the exercise is the long term answer for the patient is reviewed. The patient states understanding.

## 2023-08-16 NOTE — ASU DISCHARGE PLAN (ADULT/PEDIATRIC) - NS MD DC FALL RISK RISK
For information on Fall & Injury Prevention, visit: [email or web address] OR  [email or web address] OR  https://www.cdc.gov/steadi/patient.html

## 2023-08-16 NOTE — HISTORY OF PRESENT ILLNESS
[Lower back] : lower back [Left Leg] : left leg [8] : 8 [4] : 4 [Dull/Aching] : dull/aching [Radiating] : radiating [Constant] : constant [Household chores] : household chores [Rest] : rest [Sitting] : sitting [Standing] : standing [] : Post Surgical Visit: no [FreeTextEntry1] : TO FOOT  [FreeTextEntry7] : LT LEG  [de-identified] : DEONTE CONLEY 08/09/2023 [de-identified] : PT HAS NOT DONE PTA DUE TO PAIN IS UNBARABLE TO SUPPORT  PT HAS NOT DONE HOEMS TTECH  5

## 2023-08-17 ENCOUNTER — OUTPATIENT (OUTPATIENT)
Dept: OUTPATIENT SERVICES | Facility: HOSPITAL | Age: 58
LOS: 1 days | End: 2023-08-17
Payer: COMMERCIAL

## 2023-08-17 ENCOUNTER — APPOINTMENT (OUTPATIENT)
Dept: ORTHOPEDIC SURGERY | Facility: HOSPITAL | Age: 58
End: 2023-08-17
Payer: COMMERCIAL

## 2023-08-17 VITALS
TEMPERATURE: 98 F | OXYGEN SATURATION: 97 % | DIASTOLIC BLOOD PRESSURE: 86 MMHG | SYSTOLIC BLOOD PRESSURE: 139 MMHG | HEART RATE: 77 BPM | RESPIRATION RATE: 18 BRPM

## 2023-08-17 VITALS
RESPIRATION RATE: 18 BRPM | HEIGHT: 68 IN | TEMPERATURE: 98 F | SYSTOLIC BLOOD PRESSURE: 148 MMHG | WEIGHT: 210.1 LBS | OXYGEN SATURATION: 97 % | HEART RATE: 68 BPM | DIASTOLIC BLOOD PRESSURE: 87 MMHG

## 2023-08-17 DIAGNOSIS — Z98.61 CORONARY ANGIOPLASTY STATUS: Chronic | ICD-10-CM

## 2023-08-17 DIAGNOSIS — Z86.69 PERSONAL HISTORY OF OTHER DISEASES OF THE NERVOUS SYSTEM AND SENSE ORGANS: Chronic | ICD-10-CM

## 2023-08-17 DIAGNOSIS — Z98.49 CATARACT EXTRACTION STATUS, UNSPECIFIED EYE: Chronic | ICD-10-CM

## 2023-08-17 DIAGNOSIS — M54.16 RADICULOPATHY, LUMBAR REGION: ICD-10-CM

## 2023-08-17 LAB — GLUCOSE BLDC GLUCOMTR-MCNC: 154 MG/DL — HIGH (ref 70–99)

## 2023-08-17 PROCEDURE — 82962 GLUCOSE BLOOD TEST: CPT

## 2023-08-17 PROCEDURE — 62323 NJX INTERLAMINAR LMBR/SAC: CPT

## 2023-08-17 RX ORDER — ROSUVASTATIN CALCIUM 5 MG/1
1 TABLET ORAL
Refills: 0 | DISCHARGE

## 2023-08-17 RX ORDER — SEMAGLUTIDE 0.68 MG/ML
1 INJECTION, SOLUTION SUBCUTANEOUS
Refills: 0 | DISCHARGE

## 2023-08-17 RX ORDER — ASPIRIN/CALCIUM CARB/MAGNESIUM 324 MG
1 TABLET ORAL
Refills: 0 | DISCHARGE

## 2023-08-17 NOTE — ASU PREOP CHECKLIST - WEIGHT IN KG
95.3 Methotrexate Counseling:  Patient counseled regarding adverse effects of methotrexate including but not limited to nausea, vomiting, abnormalities in liver function tests. Patients may develop mouth sores, rash, diarrhea, and abnormalities in blood counts. The patient understands that monitoring is required including LFT's and blood counts.  There is a rare possibility of scarring of the liver and lung problems that can occur when taking methotrexate. Persistent nausea, loss of appetite, pale stools, dark urine, cough, and shortness of breath should be reported immediately. Patient advised to discontinue methotrexate treatment at least three months before attempting to become pregnant.  I discussed the need for folate supplements while taking methotrexate.  These supplements can decrease side effects during methotrexate treatment. The patient verbalized understanding of the proper use and possible adverse effects of methotrexate.  All of the patient's questions and concerns were addressed.

## 2023-08-18 ENCOUNTER — APPOINTMENT (OUTPATIENT)
Dept: PAIN MANAGEMENT | Facility: CLINIC | Age: 58
End: 2023-08-18
Payer: COMMERCIAL

## 2023-08-18 ENCOUNTER — NON-APPOINTMENT (OUTPATIENT)
Age: 58
End: 2023-08-18

## 2023-08-18 VITALS
WEIGHT: 210 LBS | HEIGHT: 68 IN | SYSTOLIC BLOOD PRESSURE: 127 MMHG | BODY MASS INDEX: 31.83 KG/M2 | DIASTOLIC BLOOD PRESSURE: 82 MMHG | HEART RATE: 69 BPM

## 2023-08-18 PROCEDURE — 99204 OFFICE O/P NEW MOD 45 MIN: CPT

## 2023-08-18 RX ORDER — GABAPENTIN 600 MG/1
600 TABLET, COATED ORAL AT BEDTIME
Qty: 30 | Refills: 2 | Status: ACTIVE | COMMUNITY
Start: 2023-08-18 | End: 1900-01-01

## 2023-08-18 RX ORDER — OXYCODONE AND ACETAMINOPHEN 5; 325 MG/1; MG/1
5-325 TABLET ORAL AT BEDTIME
Qty: 30 | Refills: 0 | Status: ACTIVE | COMMUNITY
Start: 2023-08-18 | End: 1900-01-01

## 2023-08-18 NOTE — HISTORY OF PRESENT ILLNESS
[FreeTextEntry1] : This is a case of a  58  -year-old right-handed male with a past medical history of  niddm, cad, sp cardiac stents, HLD, chronic low back pain who reports being in his usual state of health until last week when he attempted to get up from a chair and noted severe low back pain radiating to the left lower extremity involving the left foot associated with numbness of the dorsum of the left foot.  The pain was so severe that he could not maintain any position.  He went to the emergency room and received Solu-Medrol, morphine, tramadol, Dilaudid, Valium without significant relief.  He was placed on gabapentin upon discharge at 300 mg nightly and Percocet 5/320 5 at night.  He was also provided a steroid taper.  Yesterday he received a lumbar epidural steroid injection at [facility]. noting some relief  MRI lumbar spine reviewed enclosed in chart.  Now can ambulate, still feels a pulling sensation in left leg. Difficulty sleeping due to pain.  Cardiologist at [facility] Family history: Noncontributory Allergies: NKA

## 2023-08-18 NOTE — PHYSICAL EXAM
[FreeTextEntry1] : Constitutional: No signs of distress or signs of toxicity.  Mental Status: Alert and well oriented. Speech fluent. No aphasia. Fund of knowledge intact.  Psychiatric: Mood stable. Motor: No involuntary movements noted.  Adequate bulk, tone throughout, 5/5 strength of all muscle groups. Able to walk on heels and toes.  DTR: present and symmetrical; no clonus, plantars  downgoing Sensory: intact to primary and secondary modalities except for diminished sensation to touch involving the anterior left leg. Gait: non antalgic or ataxic. Eyes: no redness or swelling HEENT: intact; no signs of trauma. Neck: No masses noted Pulmonary: no respiratory distress Vascular: no temperature, color change or sudomotor changes.; no edema Musculoskeletal: examination: lumbar spine reveals no midline tenderness + Left lumbar paraspinal tenderness to palpation. No spasm. Lumbar extension causes pain. Negative CHUCK, + SLRT on left at 45 degrees.  Skin: No rash.

## 2023-08-18 NOTE — ASSESSMENT
[FreeTextEntry1] :  L4/5 Radiculopathy on left. Patient is improving following SANDY. No myelopathic features  Insomnia sec to pain/ Here for medication management.   Recommend:   Increase Gabapentin 300 to 600 mgs qhs. Consider tizanidine. Continue percocet 5/325 sparingly . Introduce PT

## 2024-02-05 ENCOUNTER — APPOINTMENT (OUTPATIENT)
Dept: ENDOCRINOLOGY | Facility: CLINIC | Age: 59
End: 2024-02-05

## 2024-02-21 ENCOUNTER — RX RENEWAL (OUTPATIENT)
Age: 59
End: 2024-02-21

## 2024-02-21 RX ORDER — SEMAGLUTIDE 2.68 MG/ML
8 INJECTION, SOLUTION SUBCUTANEOUS
Qty: 3 | Refills: 3 | Status: ACTIVE | COMMUNITY
Start: 2021-05-13 | End: 1900-01-01

## 2024-03-01 RX ORDER — ROSUVASTATIN CALCIUM 40 MG/1
40 TABLET, FILM COATED ORAL DAILY
Qty: 90 | Refills: 2 | Status: ACTIVE | COMMUNITY
Start: 2018-10-23 | End: 1900-01-01

## 2024-04-01 ENCOUNTER — APPOINTMENT (OUTPATIENT)
Dept: ENDOCRINOLOGY | Facility: CLINIC | Age: 59
End: 2024-04-01
Payer: COMMERCIAL

## 2024-04-01 ENCOUNTER — LABORATORY RESULT (OUTPATIENT)
Age: 59
End: 2024-04-01

## 2024-04-01 VITALS
BODY MASS INDEX: 32.13 KG/M2 | SYSTOLIC BLOOD PRESSURE: 124 MMHG | HEART RATE: 64 BPM | HEIGHT: 68 IN | WEIGHT: 212 LBS | DIASTOLIC BLOOD PRESSURE: 70 MMHG | OXYGEN SATURATION: 98 %

## 2024-04-01 DIAGNOSIS — E11.9 TYPE 2 DIABETES MELLITUS W/OUT COMPLICATIONS: ICD-10-CM

## 2024-04-01 DIAGNOSIS — E78.5 HYPERLIPIDEMIA, UNSPECIFIED: ICD-10-CM

## 2024-04-01 DIAGNOSIS — E66.9 OBESITY, UNSPECIFIED: ICD-10-CM

## 2024-04-01 PROCEDURE — 99214 OFFICE O/P EST MOD 30 MIN: CPT

## 2024-04-01 PROCEDURE — G2211 COMPLEX E/M VISIT ADD ON: CPT

## 2024-04-01 RX ORDER — TIRZEPATIDE 5 MG/.5ML
5 INJECTION, SOLUTION SUBCUTANEOUS
Qty: 1 | Refills: 3 | Status: ACTIVE | COMMUNITY
Start: 2024-04-01 | End: 1900-01-01

## 2024-04-01 NOTE — ASSESSMENT
[FreeTextEntry1] : 58 year old man with T2DM, well controlled and obesity  - Pt with GI side effects and no additional weight loss on ozempic  2.0 mg weekly.  Will switch to Mounjaro, start at 5.0 mg  plan to increase in 4 weeks if tolerated  - Retinopathy screening up to date  - Blood pressure at goal  - check urine microalbumin   - check cmp  Hyperlipidemia - continue rosuvastatin. Check fasting lipids and if LDL still elevated add ezetimibe  Had labs drawn this morning, results pending.  f/u 6 months

## 2024-04-01 NOTE — PHYSICAL EXAM
[Alert] : alert [Healthy Appearance] : healthy appearance [No Acute Distress] : no acute distress [Normal Sclera/Conjunctiva] : normal sclera/conjunctiva [No Proptosis] : no proptosis [No LAD] : no lymphadenopathy [Thyroid Not Enlarged] : the thyroid was not enlarged [Clear to Auscultation] : lungs were clear to auscultation bilaterally [No Respiratory Distress] : no respiratory distress [Normal S1, S2] : normal S1 and S2 [Regular Rhythm] : with a regular rhythm [Not Tender] : non-tender [Normal Bowel Sounds] : normal bowel sounds [Soft] : abdomen soft [No Spinal Tenderness] : no spinal tenderness [No Involuntary Movements] : no involuntary movements were seen [Normal Strength/Tone] : muscle strength and tone were normal [Normal Reflexes] : deep tendon reflexes were 2+ and symmetric [No Tremors] : no tremors [Normal Affect] : the affect was normal [Normal Mood] : the mood was normal

## 2024-04-02 LAB
ALBUMIN SERPL ELPH-MCNC: 4.5 G/DL
ALP BLD-CCNC: 77 U/L
ALT SERPL-CCNC: 30 U/L
ANION GAP SERPL CALC-SCNC: 12 MMOL/L
AST SERPL-CCNC: 24 U/L
BILIRUB SERPL-MCNC: 0.6 MG/DL
BUN SERPL-MCNC: 15 MG/DL
CALCIUM SERPL-MCNC: 9.5 MG/DL
CHLORIDE SERPL-SCNC: 105 MMOL/L
CHOLEST SERPL-MCNC: 125 MG/DL
CO2 SERPL-SCNC: 24 MMOL/L
CREAT SERPL-MCNC: 1 MG/DL
EGFR: 87 ML/MIN/1.73M2
ESTIMATED AVERAGE GLUCOSE: 146 MG/DL
GLUCOSE SERPL-MCNC: 112 MG/DL
HBA1C MFR BLD HPLC: 6.7 %
HCT VFR BLD CALC: 44.5 %
HDLC SERPL-MCNC: 40 MG/DL
HGB BLD-MCNC: 14.2 G/DL
LDLC SERPL CALC-MCNC: 58 MG/DL
MCHC RBC-ENTMCNC: 30.4 PG
MCHC RBC-ENTMCNC: 31.9 GM/DL
MCV RBC AUTO: 95.3 FL
NONHDLC SERPL-MCNC: 85 MG/DL
PLATELET # BLD AUTO: 227 K/UL
POTASSIUM SERPL-SCNC: 4.6 MMOL/L
PROT SERPL-MCNC: 6.7 G/DL
RBC # BLD: 4.67 M/UL
RBC # FLD: 14.4 %
SODIUM SERPL-SCNC: 141 MMOL/L
T3RU NFR SERPL: 1.1 TBI
T4 SERPL-MCNC: 6.5 UG/DL
TRIGL SERPL-MCNC: 153 MG/DL
TSH SERPL-ACNC: 2.48 UIU/ML
WBC # FLD AUTO: 6.97 K/UL

## 2024-04-17 ENCOUNTER — TRANSCRIPTION ENCOUNTER (OUTPATIENT)
Age: 59
End: 2024-04-17

## 2024-09-27 NOTE — HISTORY OF PRESENT ILLNESS
[FreeTextEntry1] : cc: diabetes  58 year old man with CAD post PTCA in 2004,  diagnosed with T2DM ~2 years ago.  Taking ozempic 2.0 mg weekly.  Has noted loose bowel movements weekly, lasts 24 hours. Not sure if related to Ozempic. Has not had further weight loss since increasing dose. Not checking glucose, no symptoms of hypoglycemia DIet- Variable Exercise - limited by back pain, trying to swim once per week and walk once weekly Last opthalmologist  visit was within the year  , no retinopathy.  No nephropathy or neuropathy.   No polyuria or polydipsia  He has not had hypertension  Hyperlipidemia: takes  rosuvastatin 40 mg  
24-Sep-2024 11:00
27-Sep-2024 10:10

## 2024-10-07 ENCOUNTER — APPOINTMENT (OUTPATIENT)
Dept: ENDOCRINOLOGY | Facility: CLINIC | Age: 59
End: 2024-10-07
Payer: COMMERCIAL

## 2024-10-07 VITALS
OXYGEN SATURATION: 96 % | SYSTOLIC BLOOD PRESSURE: 124 MMHG | DIASTOLIC BLOOD PRESSURE: 74 MMHG | WEIGHT: 208 LBS | HEART RATE: 76 BPM | BODY MASS INDEX: 31.63 KG/M2

## 2024-10-07 DIAGNOSIS — E66.9 OBESITY, UNSPECIFIED: ICD-10-CM

## 2024-10-07 DIAGNOSIS — E78.5 HYPERLIPIDEMIA, UNSPECIFIED: ICD-10-CM

## 2024-10-07 DIAGNOSIS — E11.9 TYPE 2 DIABETES MELLITUS W/OUT COMPLICATIONS: ICD-10-CM

## 2024-10-07 LAB — HBA1C MFR BLD HPLC: 6.2

## 2024-10-07 PROCEDURE — 83036 HEMOGLOBIN GLYCOSYLATED A1C: CPT | Mod: QW

## 2024-10-07 PROCEDURE — 99214 OFFICE O/P EST MOD 30 MIN: CPT

## 2024-10-07 PROCEDURE — G2211 COMPLEX E/M VISIT ADD ON: CPT

## 2024-10-31 ENCOUNTER — APPOINTMENT (OUTPATIENT)
Dept: DERMATOLOGY | Facility: CLINIC | Age: 59
End: 2024-10-31
Payer: COMMERCIAL

## 2024-10-31 VITALS — HEIGHT: 68 IN | BODY MASS INDEX: 31.52 KG/M2 | WEIGHT: 208 LBS

## 2024-10-31 DIAGNOSIS — L82.1 OTHER SEBORRHEIC KERATOSIS: ICD-10-CM

## 2024-10-31 DIAGNOSIS — Z12.83 ENCOUNTER FOR SCREENING FOR MALIGNANT NEOPLASM OF SKIN: ICD-10-CM

## 2024-10-31 DIAGNOSIS — B35.3 TINEA PEDIS: ICD-10-CM

## 2024-10-31 DIAGNOSIS — L81.4 OTHER MELANIN HYPERPIGMENTATION: ICD-10-CM

## 2024-10-31 PROCEDURE — 99204 OFFICE O/P NEW MOD 45 MIN: CPT

## 2025-01-09 ENCOUNTER — NON-APPOINTMENT (OUTPATIENT)
Age: 60
End: 2025-01-09

## 2025-01-09 RX ORDER — TIRZEPATIDE 7.5 MG/.5ML
7.5 INJECTION, SOLUTION SUBCUTANEOUS
Qty: 1 | Refills: 0 | Status: ACTIVE | COMMUNITY
Start: 2025-01-09

## (undated) DEVICE — TUBING ALARIS PUMP MODULE NON-DEHP

## (undated) DEVICE — SYR IV FLUSH SALINE 10ML 30/TY

## (undated) DEVICE — SOL INJ LR 500ML

## (undated) DEVICE — NDL SPINAL 22G X 3.5" QUINCKE

## (undated) DEVICE — CATH IV SAFE BC 22G X 1" (BLUE)

## (undated) DEVICE — TRAY EPIDURAL SINGLE DOSE

## (undated) DEVICE — GLV 8.5 PROTEXIS (WHITE)

## (undated) DEVICE — TUBING IV EXTENSION MACRO W CLAVE 7"

## (undated) DEVICE — STYLET  ENDOTRACH 7.5MM X 10MM

## (undated) DEVICE — PACK IV START WITH CHG